# Patient Record
Sex: MALE | Race: WHITE | NOT HISPANIC OR LATINO | URBAN - METROPOLITAN AREA
[De-identification: names, ages, dates, MRNs, and addresses within clinical notes are randomized per-mention and may not be internally consistent; named-entity substitution may affect disease eponyms.]

---

## 2019-03-11 ENCOUNTER — OUTPATIENT (OUTPATIENT)
Dept: OUTPATIENT SERVICES | Facility: HOSPITAL | Age: 84
LOS: 1 days | Discharge: HOME | End: 2019-03-11

## 2019-03-11 DIAGNOSIS — N18.4 CHRONIC KIDNEY DISEASE, STAGE 4 (SEVERE): ICD-10-CM

## 2019-03-11 DIAGNOSIS — I10 ESSENTIAL (PRIMARY) HYPERTENSION: ICD-10-CM

## 2019-03-11 DIAGNOSIS — R80.9 PROTEINURIA, UNSPECIFIED: ICD-10-CM

## 2019-06-10 ENCOUNTER — OUTPATIENT (OUTPATIENT)
Dept: OUTPATIENT SERVICES | Facility: HOSPITAL | Age: 84
LOS: 1 days | Discharge: HOME | End: 2019-06-10

## 2019-06-10 DIAGNOSIS — I10 ESSENTIAL (PRIMARY) HYPERTENSION: ICD-10-CM

## 2019-06-10 DIAGNOSIS — R80.9 PROTEINURIA, UNSPECIFIED: ICD-10-CM

## 2019-06-10 DIAGNOSIS — N18.4 CHRONIC KIDNEY DISEASE, STAGE 4 (SEVERE): ICD-10-CM

## 2019-07-04 ENCOUNTER — INPATIENT (INPATIENT)
Facility: HOSPITAL | Age: 84
LOS: 6 days | Discharge: HOSPICE | End: 2019-07-11
Attending: INTERNAL MEDICINE | Admitting: INTERNAL MEDICINE
Payer: MEDICARE

## 2019-07-04 VITALS
DIASTOLIC BLOOD PRESSURE: 70 MMHG | RESPIRATION RATE: 16 BRPM | TEMPERATURE: 97 F | OXYGEN SATURATION: 96 % | HEART RATE: 78 BPM | SYSTOLIC BLOOD PRESSURE: 153 MMHG

## 2019-07-04 LAB
ALBUMIN SERPL ELPH-MCNC: 2.8 G/DL — LOW (ref 3.5–5.2)
ALP SERPL-CCNC: 143 U/L — HIGH (ref 30–115)
ALT FLD-CCNC: 63 U/L — HIGH (ref 0–41)
ANION GAP SERPL CALC-SCNC: 15 MMOL/L — HIGH (ref 7–14)
AST SERPL-CCNC: 63 U/L — HIGH (ref 0–41)
BILIRUB SERPL-MCNC: 0.4 MG/DL — SIGNIFICANT CHANGE UP (ref 0.2–1.2)
BUN SERPL-MCNC: 50 MG/DL — HIGH (ref 10–20)
CALCIUM SERPL-MCNC: 10.1 MG/DL — SIGNIFICANT CHANGE UP (ref 8.5–10.1)
CHLORIDE SERPL-SCNC: 93 MMOL/L — LOW (ref 98–110)
CO2 SERPL-SCNC: 25 MMOL/L — SIGNIFICANT CHANGE UP (ref 17–32)
CREAT SERPL-MCNC: 2.4 MG/DL — HIGH (ref 0.7–1.5)
GAS PNL BLDV: SIGNIFICANT CHANGE UP
GLUCOSE SERPL-MCNC: 119 MG/DL — HIGH (ref 70–99)
HCT VFR BLD CALC: 30.6 % — LOW (ref 42–52)
HGB BLD-MCNC: 9.9 G/DL — LOW (ref 14–18)
INR BLD: 1 RATIO — SIGNIFICANT CHANGE UP (ref 0.65–1.3)
MCHC RBC-ENTMCNC: 26.7 PG — LOW (ref 27–31)
MCHC RBC-ENTMCNC: 32.4 G/DL — SIGNIFICANT CHANGE UP (ref 32–37)
MCV RBC AUTO: 82.5 FL — SIGNIFICANT CHANGE UP (ref 80–94)
NRBC # BLD: 0 /100 WBCS — SIGNIFICANT CHANGE UP (ref 0–0)
NT-PROBNP SERPL-SCNC: 4817 PG/ML — HIGH (ref 0–300)
PLATELET # BLD AUTO: 426 K/UL — HIGH (ref 130–400)
POTASSIUM SERPL-MCNC: 5.3 MMOL/L — HIGH (ref 3.5–5)
POTASSIUM SERPL-SCNC: 5.3 MMOL/L — HIGH (ref 3.5–5)
PROT SERPL-MCNC: 6 G/DL — SIGNIFICANT CHANGE UP (ref 6–8)
PROTHROM AB SERPL-ACNC: 11.5 SEC — SIGNIFICANT CHANGE UP (ref 9.95–12.87)
RBC # BLD: 3.71 M/UL — LOW (ref 4.7–6.1)
RBC # FLD: 16.7 % — HIGH (ref 11.5–14.5)
SODIUM SERPL-SCNC: 133 MMOL/L — LOW (ref 135–146)
TROPONIN T SERPL-MCNC: 0.08 NG/ML — CRITICAL HIGH
WBC # BLD: 18.64 K/UL — HIGH (ref 4.8–10.8)
WBC # FLD AUTO: 18.64 K/UL — HIGH (ref 4.8–10.8)

## 2019-07-04 PROCEDURE — 99285 EMERGENCY DEPT VISIT HI MDM: CPT

## 2019-07-04 PROCEDURE — 72170 X-RAY EXAM OF PELVIS: CPT | Mod: 26

## 2019-07-04 PROCEDURE — 93010 ELECTROCARDIOGRAM REPORT: CPT

## 2019-07-04 PROCEDURE — 71046 X-RAY EXAM CHEST 2 VIEWS: CPT | Mod: 26

## 2019-07-04 PROCEDURE — 72125 CT NECK SPINE W/O DYE: CPT | Mod: 26

## 2019-07-04 PROCEDURE — 70450 CT HEAD/BRAIN W/O DYE: CPT | Mod: 26

## 2019-07-04 RX ORDER — IOHEXOL 300 MG/ML
30 INJECTION, SOLUTION INTRAVENOUS ONCE
Refills: 0 | Status: COMPLETED | OUTPATIENT
Start: 2019-07-04 | End: 2019-07-04

## 2019-07-04 RX ORDER — SODIUM CHLORIDE 9 MG/ML
1000 INJECTION, SOLUTION INTRAVENOUS ONCE
Refills: 0 | Status: COMPLETED | OUTPATIENT
Start: 2019-07-04 | End: 2019-07-04

## 2019-07-04 RX ADMIN — IOHEXOL 30 MILLILITER(S): 300 INJECTION, SOLUTION INTRAVENOUS at 23:03

## 2019-07-04 RX ADMIN — SODIUM CHLORIDE 1000 MILLILITER(S): 9 INJECTION, SOLUTION INTRAVENOUS at 21:48

## 2019-07-04 NOTE — ED PROVIDER NOTE - NS ED ROS FT
Constitutional: (-) fever  Eyes/ENT: (-) visual changes   Cardiovascular: (-) chest pain, (-) syncope  Respiratory: (-) cough, (-) shortness of breath  Gastrointestinal: (+) vomiting, (-) diarrhea  Genitourinary: (-) dysuria, (-) hesitancy, (-) frequency   Musculoskeletal: (-) neck pain, (-) back pain, (-) joint pain  Integumentary: (-) rash, (-) edema  Neurological: (-) headache, (-) altered mental status, weakness   Allergic/Immunologic: (-) pruritus

## 2019-07-04 NOTE — ED PROVIDER NOTE - OBJECTIVE STATEMENT
88 yo male with a pmh of CAD, DM, and anemia presents with progressive weakness. daughter states that for the past two weeks he has needed more help ambulating as well as had a decrease in appetite. he has become more SOB with ambulation and fallen twice due to the weakness. there was no LOC with the falls and daughter denies him hitting his head but the falls were unwitnessed and the pt does not recall the falls. he denies any other symptoms including fevers, chill, abdominal pain, or chest pain.

## 2019-07-04 NOTE — ED PROVIDER NOTE - ATTENDING CONTRIBUTION TO CARE
patient is BIB daughter for evaluation of CROCKETT, generalized weakness, no appetite, h/o fall twice due to generalized weakness, denies any injuries.   vitals noted  lungs: B/L good air entry, no wheezing/rhonchi  abd: +BS, NT, ND, soft  A/P: Dyspnea/generalized weakness  falls  labs, imaging, reevaluation

## 2019-07-04 NOTE — ED PROVIDER NOTE - CARE PLAN
Principal Discharge DX:	Weakness Principal Discharge DX:	[facility] (acute kidney injury)  Secondary Diagnosis:	CHF (congestive heart failure)  Secondary Diagnosis:	Troponin level elevated

## 2019-07-04 NOTE — ED PROVIDER NOTE - CLINICAL SUMMARY MEDICAL DECISION MAKING FREE TEXT BOX
Patient remained stable in ED, improved well, labs/CT findings noted, discussed with patient and his daughter, is admitted to Medicine in stable condition.

## 2019-07-04 NOTE — ED ADULT TRIAGE NOTE - CHIEF COMPLAINT QUOTE
Patient present from home for weakness, decreased PO intake, as per EMT, patient had a near fall episode at home witnessed, denies head injury or LOC, not on anticoagulant, patient is alert and oriented x3 at this time.

## 2019-07-04 NOTE — ED ADULT NURSE REASSESSMENT NOTE - NS ED NURSE REASSESS COMMENT FT1
Pt assessed A/o times 4 Vs stable remain comfortable , comfort provide denies no pain no SOB , Po contrast order is given ,Pt with B/L foot edema noted Ed attending made aware on going nursing  observation   .

## 2019-07-05 LAB
ALBUMIN SERPL ELPH-MCNC: 2.4 G/DL — LOW (ref 3.5–5.2)
ALP SERPL-CCNC: 124 U/L — HIGH (ref 30–115)
ALT FLD-CCNC: 57 U/L — HIGH (ref 0–41)
ANION GAP SERPL CALC-SCNC: 13 MMOL/L — SIGNIFICANT CHANGE UP (ref 7–14)
APPEARANCE UR: CLEAR — SIGNIFICANT CHANGE UP
AST SERPL-CCNC: 54 U/L — HIGH (ref 0–41)
BASOPHILS # BLD AUTO: 0.02 K/UL — SIGNIFICANT CHANGE UP (ref 0–0.2)
BASOPHILS NFR BLD AUTO: 0.1 % — SIGNIFICANT CHANGE UP (ref 0–1)
BILIRUB SERPL-MCNC: 0.4 MG/DL — SIGNIFICANT CHANGE UP (ref 0.2–1.2)
BILIRUB UR-MCNC: NEGATIVE — SIGNIFICANT CHANGE UP
BUN SERPL-MCNC: 47 MG/DL — HIGH (ref 10–20)
CALCIUM SERPL-MCNC: 9.3 MG/DL — SIGNIFICANT CHANGE UP (ref 8.5–10.1)
CHLORIDE SERPL-SCNC: 95 MMOL/L — LOW (ref 98–110)
CO2 SERPL-SCNC: 25 MMOL/L — SIGNIFICANT CHANGE UP (ref 17–32)
COLOR SPEC: YELLOW — SIGNIFICANT CHANGE UP
CREAT SERPL-MCNC: 2.1 MG/DL — HIGH (ref 0.7–1.5)
DIFF PNL FLD: NEGATIVE — SIGNIFICANT CHANGE UP
EOSINOPHIL # BLD AUTO: 0.02 K/UL — SIGNIFICANT CHANGE UP (ref 0–0.7)
EOSINOPHIL NFR BLD AUTO: 0.1 % — SIGNIFICANT CHANGE UP (ref 0–8)
GLUCOSE BLDC GLUCOMTR-MCNC: 127 MG/DL — HIGH (ref 70–99)
GLUCOSE BLDC GLUCOMTR-MCNC: 132 MG/DL — HIGH (ref 70–99)
GLUCOSE BLDC GLUCOMTR-MCNC: 139 MG/DL — HIGH (ref 70–99)
GLUCOSE BLDC GLUCOMTR-MCNC: 174 MG/DL — HIGH (ref 70–99)
GLUCOSE SERPL-MCNC: 132 MG/DL — HIGH (ref 70–99)
GLUCOSE UR QL: NEGATIVE MG/DL — SIGNIFICANT CHANGE UP
HCT VFR BLD CALC: 27.9 % — LOW (ref 42–52)
HGB BLD-MCNC: 9 G/DL — LOW (ref 14–18)
IMM GRANULOCYTES NFR BLD AUTO: 0.7 % — HIGH (ref 0.1–0.3)
IRON SATN MFR SERPL: 24 % — SIGNIFICANT CHANGE UP (ref 15–50)
IRON SATN MFR SERPL: 29 UG/DL — LOW (ref 35–150)
KETONES UR-MCNC: NEGATIVE — SIGNIFICANT CHANGE UP
LDH SERPL L TO P-CCNC: 253 U/L — HIGH (ref 50–242)
LEUKOCYTE ESTERASE UR-ACNC: NEGATIVE — SIGNIFICANT CHANGE UP
LYMPHOCYTES # BLD AUTO: 1.55 K/UL — SIGNIFICANT CHANGE UP (ref 1.2–3.4)
LYMPHOCYTES # BLD AUTO: 9.9 % — LOW (ref 20.5–51.1)
MAGNESIUM SERPL-MCNC: 2.1 MG/DL — SIGNIFICANT CHANGE UP (ref 1.8–2.4)
MCHC RBC-ENTMCNC: 26.8 PG — LOW (ref 27–31)
MCHC RBC-ENTMCNC: 32.3 G/DL — SIGNIFICANT CHANGE UP (ref 32–37)
MCV RBC AUTO: 83 FL — SIGNIFICANT CHANGE UP (ref 80–94)
MONOCYTES # BLD AUTO: 0.73 K/UL — HIGH (ref 0.1–0.6)
MONOCYTES NFR BLD AUTO: 4.7 % — SIGNIFICANT CHANGE UP (ref 1.7–9.3)
NEUTROPHILS # BLD AUTO: 13.23 K/UL — HIGH (ref 1.4–6.5)
NEUTROPHILS NFR BLD AUTO: 84.5 % — HIGH (ref 42.2–75.2)
NITRITE UR-MCNC: NEGATIVE — SIGNIFICANT CHANGE UP
NRBC # BLD: 0 /100 WBCS — SIGNIFICANT CHANGE UP (ref 0–0)
PH UR: 6 — SIGNIFICANT CHANGE UP (ref 5–8)
PLATELET # BLD AUTO: 460 K/UL — HIGH (ref 130–400)
POTASSIUM SERPL-MCNC: 4.8 MMOL/L — SIGNIFICANT CHANGE UP (ref 3.5–5)
POTASSIUM SERPL-SCNC: 4.8 MMOL/L — SIGNIFICANT CHANGE UP (ref 3.5–5)
PROT SERPL-MCNC: 4.9 G/DL — LOW (ref 6–8)
PROT UR-MCNC: NEGATIVE MG/DL — SIGNIFICANT CHANGE UP
RBC # BLD: 3.36 M/UL — LOW (ref 4.7–6.1)
RBC # BLD: 3.36 M/UL — LOW (ref 4.7–6.1)
RBC # FLD: 16.8 % — HIGH (ref 11.5–14.5)
RETICS #: 74.8 K/UL — SIGNIFICANT CHANGE UP (ref 25–125)
RETICS/RBC NFR: 2.2 % — HIGH (ref 0.5–1.5)
SODIUM SERPL-SCNC: 133 MMOL/L — LOW (ref 135–146)
SP GR SPEC: 1.01 — SIGNIFICANT CHANGE UP (ref 1.01–1.03)
TIBC SERPL-MCNC: 119 UG/DL — LOW (ref 220–430)
TROPONIN T SERPL-MCNC: 0.08 NG/ML — CRITICAL HIGH
UIBC SERPL-MCNC: 90 UG/DL — LOW (ref 110–370)
UROBILINOGEN FLD QL: 0.2 MG/DL — SIGNIFICANT CHANGE UP (ref 0.2–0.2)
WBC # BLD: 15.66 K/UL — HIGH (ref 4.8–10.8)
WBC # FLD AUTO: 15.66 K/UL — HIGH (ref 4.8–10.8)

## 2019-07-05 PROCEDURE — 99222 1ST HOSP IP/OBS MODERATE 55: CPT

## 2019-07-05 PROCEDURE — 71260 CT THORAX DX C+: CPT | Mod: 26

## 2019-07-05 PROCEDURE — 93306 TTE W/DOPPLER COMPLETE: CPT | Mod: 26

## 2019-07-05 PROCEDURE — 99223 1ST HOSP IP/OBS HIGH 75: CPT

## 2019-07-05 PROCEDURE — 74176 CT ABD & PELVIS W/O CONTRAST: CPT | Mod: 26

## 2019-07-05 RX ORDER — LISINOPRIL 2.5 MG/1
5 TABLET ORAL DAILY
Refills: 0 | Status: DISCONTINUED | OUTPATIENT
Start: 2019-07-05 | End: 2019-07-05

## 2019-07-05 RX ORDER — METOPROLOL TARTRATE 50 MG
50 TABLET ORAL DAILY
Refills: 0 | Status: DISCONTINUED | OUTPATIENT
Start: 2019-07-05 | End: 2019-07-11

## 2019-07-05 RX ORDER — LATANOPROST 0.05 MG/ML
1 SOLUTION/ DROPS OPHTHALMIC; TOPICAL AT BEDTIME
Refills: 0 | Status: DISCONTINUED | OUTPATIENT
Start: 2019-07-05 | End: 2019-07-11

## 2019-07-05 RX ORDER — FUROSEMIDE 40 MG
1 TABLET ORAL
Qty: 0 | Refills: 0 | DISCHARGE

## 2019-07-05 RX ORDER — DOCUSATE SODIUM 100 MG
100 CAPSULE ORAL THREE TIMES A DAY
Refills: 0 | Status: DISCONTINUED | OUTPATIENT
Start: 2019-07-05 | End: 2019-07-11

## 2019-07-05 RX ORDER — CHLORHEXIDINE GLUCONATE 213 G/1000ML
1 SOLUTION TOPICAL
Refills: 0 | Status: DISCONTINUED | OUTPATIENT
Start: 2019-07-05 | End: 2019-07-11

## 2019-07-05 RX ORDER — FUROSEMIDE 40 MG
20 TABLET ORAL DAILY
Refills: 0 | Status: DISCONTINUED | OUTPATIENT
Start: 2019-07-05 | End: 2019-07-05

## 2019-07-05 RX ORDER — HEPARIN SODIUM 5000 [USP'U]/ML
5000 INJECTION INTRAVENOUS; SUBCUTANEOUS EVERY 8 HOURS
Refills: 0 | Status: DISCONTINUED | OUTPATIENT
Start: 2019-07-05 | End: 2019-07-11

## 2019-07-05 RX ORDER — POLYETHYLENE GLYCOL 3350 17 G/17G
17 POWDER, FOR SOLUTION ORAL
Refills: 0 | Status: DISCONTINUED | OUTPATIENT
Start: 2019-07-05 | End: 2019-07-11

## 2019-07-05 RX ORDER — QUINAPRIL HYDROCHLORIDE 40 MG/1
1 TABLET, FILM COATED ORAL
Qty: 0 | Refills: 0 | DISCHARGE

## 2019-07-05 RX ORDER — ATORVASTATIN CALCIUM 80 MG/1
1 TABLET, FILM COATED ORAL
Qty: 0 | Refills: 0 | DISCHARGE

## 2019-07-05 RX ORDER — ASPIRIN/CALCIUM CARB/MAGNESIUM 324 MG
81 TABLET ORAL DAILY
Refills: 0 | Status: DISCONTINUED | OUTPATIENT
Start: 2019-07-05 | End: 2019-07-05

## 2019-07-05 RX ORDER — BENZOYL PEROXIDE MICRONIZED 5.8 %
1 TOWELETTE (EA) TOPICAL
Qty: 0 | Refills: 0 | DISCHARGE

## 2019-07-05 RX ORDER — METOPROLOL TARTRATE 50 MG
1 TABLET ORAL
Qty: 0 | Refills: 0 | DISCHARGE

## 2019-07-05 RX ORDER — ATORVASTATIN CALCIUM 80 MG/1
80 TABLET, FILM COATED ORAL AT BEDTIME
Refills: 0 | Status: DISCONTINUED | OUTPATIENT
Start: 2019-07-05 | End: 2019-07-11

## 2019-07-05 RX ORDER — ASPIRIN/CALCIUM CARB/MAGNESIUM 324 MG
81 TABLET ORAL DAILY
Refills: 0 | Status: DISCONTINUED | OUTPATIENT
Start: 2019-07-05 | End: 2019-07-11

## 2019-07-05 RX ORDER — LATANOPROST 0.05 MG/ML
1 SOLUTION/ DROPS OPHTHALMIC; TOPICAL
Qty: 0 | Refills: 0 | DISCHARGE

## 2019-07-05 RX ORDER — LISINOPRIL 2.5 MG/1
5 TABLET ORAL DAILY
Refills: 0 | Status: DISCONTINUED | OUTPATIENT
Start: 2019-07-05 | End: 2019-07-07

## 2019-07-05 RX ADMIN — Medication 81 MILLIGRAM(S): at 12:05

## 2019-07-05 RX ADMIN — Medication 100 MILLIGRAM(S): at 21:14

## 2019-07-05 RX ADMIN — Medication 20 MILLIGRAM(S): at 06:28

## 2019-07-05 RX ADMIN — POLYETHYLENE GLYCOL 3350 17 GRAM(S): 17 POWDER, FOR SOLUTION ORAL at 17:32

## 2019-07-05 RX ADMIN — LISINOPRIL 5 MILLIGRAM(S): 2.5 TABLET ORAL at 12:05

## 2019-07-05 RX ADMIN — ATORVASTATIN CALCIUM 80 MILLIGRAM(S): 80 TABLET, FILM COATED ORAL at 21:13

## 2019-07-05 RX ADMIN — HEPARIN SODIUM 5000 UNIT(S): 5000 INJECTION INTRAVENOUS; SUBCUTANEOUS at 21:14

## 2019-07-05 RX ADMIN — HEPARIN SODIUM 5000 UNIT(S): 5000 INJECTION INTRAVENOUS; SUBCUTANEOUS at 14:29

## 2019-07-05 RX ADMIN — Medication 100 MILLIGRAM(S): at 14:29

## 2019-07-05 RX ADMIN — LATANOPROST 1 DROP(S): 0.05 SOLUTION/ DROPS OPHTHALMIC; TOPICAL at 21:14

## 2019-07-05 RX ADMIN — Medication 50 MILLIGRAM(S): at 06:28

## 2019-07-05 NOTE — H&P ADULT - ATTENDING COMMENTS
89 years old male with reported history of CAD but refused stent, DM, and chronic anemia, presented to the ED with progressive weakness. patient is a poor historian and history was obtained from chart and family member. As per his daughter, patient has become more unsteady on ambulation and requires more assistance. Patient also has decreased appetite. Patient has fallen twice at home and they were both unwitnessed. Patient has no recollection of his fall and denies any LOC. Family also reports increased work of breathing, especially on exertion. Patient himself has no acute complaints and reports good appetite. In ED, trauma work up was completed due to previous falls and an incidental findings of 9.8cm colonic mass is seen in ascending colon.    REVIEW OF SYSTEMS: see cc/HPI  CONSTITUTIONAL: No weakness, fevers or chills  EYES/ENT: No visual changes;  No vertigo or throat pain   NECK: No pain or stiffness  RESPIRATORY: No cough, wheezing, hemoptysis; No shortness of breath  CARDIOVASCULAR: No chest pain or palpitations  GASTROINTESTINAL: No abdominal or epigastric pain. No nausea, vomiting, or hematemesis; No diarrhea or constipation. No melena or hematochezia.  GENITOURINARY: No dysuria, frequency or hematuria  NEUROLOGICAL: No numbness or weakness  SKIN: No itching, rashes  Physical Exam:   General: WN/WD NAD  Neurology: A&Ox3, nonfocal, follows commands  Eyes: PERRLA/ EOMI  ENT/Neck: Neck supple, trachea midline, No JVD  Respiratory: CTA B/L, No wheezing, rales, rhonchi  CV: Normal rate regular rhythm, S1S2, no murmurs, rubs or gallops  Abdominal: Soft, NT, ND +BS,   Extremities: No edema, + peripheral pulses  Skin: No Rashes, Hematoma, Ecchymosis  Incisions:   Tubes: 89 years old male with reported history of CAD but refused stent, DM, and chronic anemia, presented to the ED with progressive weakness. patient is a poor historian and history was obtained from chart and family member. As per his daughter, patient has become more unsteady on ambulation and requires more assistance. Patient also has decreased appetite. Patient has fallen twice at home and they were both unwitnessed. Patient has no recollection of his fall and denies any LOC. Family also reports increased work of breathing, especially on exertion. Patient himself has no acute complaints and reports good appetite. In ED, trauma work up was completed due to previous falls and an incidental findings of 9.8cm colonic mass is seen in ascending colon.    REVIEW OF SYSTEMS: see cc/HPI  CONSTITUTIONAL: No weakness, fevers or chills  EYES/ENT: No visual changes;  No vertigo or throat pain   NECK: No pain or stiffness  RESPIRATORY: No cough, wheezing, hemoptysis; No shortness of breath  CARDIOVASCULAR: No chest pain or palpitations  GASTROINTESTINAL: No abdominal or epigastric pain. No nausea, vomiting, or hematemesis; No diarrhea or constipation. No melena or hematochezia.  GENITOURINARY: No dysuria, frequency or hematuria  NEUROLOGICAL: No numbness or weakness  SKIN: No itching, rashes  Physical Exam:   General: WN/WD NAD  Neurology: A&Ox3, nonfocal, follows commands  Eyes: PERRLA/ EOMI  ENT/Neck: Neck supple, trachea midline, No JVD  Respiratory: CTA B/L, No wheezing, rales, rhonchi  CV: Normal rate regular rhythm, S1S2, no murmurs, rubs or gallops  Abdominal: Soft, NT, ND +BS,   Extremities: No edema, + peripheral pulses  Skin: No Rashes, Hematoma, Ecchymosis  Incisions: n/.a  Tubes: n/a    A/P  Ascending colon mass r/o neoplasia  -Agree w/ tumor markers and staging C chest   -GI eval for colonoscopy / biopsy  -clear liquid diet if procedure planned     CKG - stage IV   -serial Scr   -IV hydration pre and post CT     Anemia - r/o RAJAN ( given mass0 +/- 2/2 chronic disease   -check iron stores/ B12 / Folate/ retic %    CAD- stable   -hold off in ASA if colonoscopy/biopsy planned  -c/w other cardiac rx  -2D echo    Type II DM   -f/s and insulin sliding scale    Leukocytosis - ???etiology  -check UCx and Blood Cx   -serial CBC    DVT prophylaxis 89 years old male with reported history of CAD but refused stent, DM, and chronic anemia, presented to the ED with progressive weakness. patient is a poor historian and history was obtained from chart and family member. As per his daughter, patient has become more unsteady on ambulation and requires more assistance. Patient also has decreased appetite. Patient has fallen twice at home and they were both unwitnessed. Patient has no recollection of his fall and denies any LOC. Family also reports increased work of breathing, especially on exertion. Patient himself has no acute complaints and reports good appetite. In ED, trauma work up was completed due to previous falls and an incidental findings of 9.8cm colonic mass is seen in ascending colon.    REVIEW OF SYSTEMS: see cc/HPI  CONSTITUTIONAL: No weakness, fevers or chills  EYES/ENT: No visual changes;  No vertigo or throat pain   NECK: No pain or stiffness  RESPIRATORY: No cough, wheezing, hemoptysis; No shortness of breath  CARDIOVASCULAR: No chest pain or palpitations  GASTROINTESTINAL: No abdominal or epigastric pain. No nausea, vomiting, or hematemesis; No diarrhea or constipation. No melena or hematochezia.  GENITOURINARY: No dysuria, frequency or hematuria  NEUROLOGICAL: No numbness or weakness  SKIN: No itching, rashes  Physical Exam:   General: WN/WD NAD  Neurology: A&Ox3, nonfocal, follows commands  Eyes: PERRLA/ EOMI  ENT/Neck: Neck supple, trachea midline, No JVD  Respiratory: CTA B/L, No wheezing, rales, rhonchi  CV: Normal rate regular rhythm, S1S2, no murmurs, rubs or gallops  Abdominal: Soft, NT, ND +BS,   Extremities: (+) pedal edema, + peripheral pulses  Skin: No Rashes, Hematoma, Ecchymosis  Incisions: n/.a  Tubes: n/a    A/P  Ascending colon mass r/o neoplasia  -Agree w/ tumor markers and staging C chest   -GI eval for colonoscopy / biopsy  -clear liquid diet if procedure planned     Weakness / Falls r/o Arrhythmia r/o orthostatic hypotension  -admit to tele   -IV fluids - gentle hydration and hold diuretics  -orthostatic vitals   -check 2D echo r/o AS    CKG - stage IV / Dehydration (BUN /Cr 50/2.4)  -serial Scr   -IV hydration pre and post CT   -hold Lasix for now and monitor for fluid overload ( mild pedal edema present)    Anemia - r/o RAJAN ( given mass0 +/- 2/2 chronic disease   -check iron stores/ B12 / Folate/ retic %    CAD- stable   -hold off in ASA if colonoscopy/biopsy planned  -c/w other cardiac rx  -2D echo    Type II DM   -f/s and insulin sliding scale    Leukocytosis - ???etiology  -check UCx and Blood Cx   -serial CBC    DVT prophylaxis 89 years old male with reported history of CAD but refused stent, DM, and chronic anemia, presented to the ED with progressive weakness. patient is a poor historian and history was obtained from chart and family member. As per his daughter, patient has become more unsteady on ambulation and requires more assistance. Patient also has decreased appetite. Patient has fallen twice at home and they were both unwitnessed. Patient has no recollection of his fall and denies any LOC. Family also reports increased work of breathing, especially on exertion. Patient himself has no acute complaints and reports good appetite. In ED, trauma work up was completed due to previous falls and an incidental findings of 9.8cm colonic mass is seen in ascending colon.    REVIEW OF SYSTEMS: see cc/HPI  CONSTITUTIONAL: No weakness, fevers or chills  EYES/ENT: No visual changes;  No vertigo or throat pain   NECK: No pain or stiffness  RESPIRATORY: No cough, wheezing, hemoptysis; No shortness of breath  CARDIOVASCULAR: No chest pain or palpitations  GASTROINTESTINAL: No abdominal or epigastric pain. No nausea, vomiting, or hematemesis; No diarrhea or constipation. No melena or hematochezia.  GENITOURINARY: No dysuria, frequency or hematuria  NEUROLOGICAL: No numbness or weakness  SKIN: No itching, rashes  Physical Exam:   General: WN/WD NAD  Neurology: A&Ox3, nonfocal, follows commands  Eyes: PERRLA/ EOMI  ENT/Neck: Neck supple, trachea midline, No JVD  Respiratory: CTA B/L, No wheezing, rales, rhonchi  CV: Normal rate regular rhythm, S1S2, no murmurs, rubs or gallops  Abdominal: Soft, NT, ND +BS,   Extremities: (+) pedal edema, + peripheral pulses  Skin: No Rashes, Hematoma, Ecchymosis  Incisions: n/.a  Tubes: n/a    A/P  Ascending colon mass r/o neoplasia  -Agree w/ tumor markers and staging C chest   -GI eval for colonoscopy / biopsy  -clear liquid diet if procedure planned     Weakness / Falls r/o Arrhythmia r/o orthostatic hypotension and abnormal Trop ( possible related to CKD) r/o MI  -admit to tele  -serial [place] and EKG   -IV fluids - gentle hydration and hold diuretics  -orthostatic vitals   -check 2D echo r/o AS    CKG - stage IV / Dehydration (BUN /Cr 50/2.4)  -serial Scr   -IV hydration pre and post CT   -hold Lasix for now and monitor for fluid overload ( mild pedal edema present)  -hold ACEI and Nephrotoxic agents    Anemia - r/o RAJAN ( given mass0 +/- 2/2 chronic disease   -check iron stores/ B12 / Folate/ retic %    CAD- stable   -hold off in ASA if colonoscopy/biopsy planned  -c/w other cardiac rx  -2D echo    Type II DM   -f/s and insulin sliding scale    Leukocytosis - ???etiology  -check UCx and Blood Cx   -serial CBC    DVT prophylaxis

## 2019-07-05 NOTE — H&P ADULT - NSHPLABSRESULTS_GEN_ALL_CORE
9.9    18.64 )-----------( 426      ( 2019 21:02 )             30.6   07-    133<L>  |  93<L>  |  50<H>  ----------------------------<  119<H>  5.3<H>   |  25  |  2.4<H>    Ca    10.1      2019 21:02    TPro  6.0  /  Alb  2.8<L>  /  TBili  0.4  /  DBili  x   /  AST  63<H>  /  ALT  63<H>  /  AlkPhos  143<H>  07    PT/INR - ( 2019 21:02 )   PT: 11.50 sec;   INR: 1.00 ratio      CARDIAC MARKERS ( 2019 21:02 )  x     / 0.08 ng/mL / x     / x     / x        Serum Pro-Brain Natriuretic Peptide: 4817 pg/mL (19 @ 21:02)    Urinalysis Basic - ( 2019 01:45 )    Color: Yellow / Appearance: Clear / S.015 / pH: x  Gluc: x / Ketone: Negative  / Bili: Negative / Urobili: 0.2 mg/dL   Blood: x / Protein: Negative mg/dL / Nitrite: Negative   Leuk Esterase: Negative / RBC: x / WBC x   Sq Epi: x / Non Sq Epi: x / Bacteria: x      < from: CT Head No Cont (19 @ 22:38) >    No CT evidence for acute intracranial pathology.    < end of copied text >    < from: CT Cervical Spine No Cont (19 @ 22:38) >    No acute fracture or significant subluxation of the cervical spine.    < end of copied text >    < from: CT Abdomen and Pelvis w/ Oral Cont (19 @ 02:09) >    1. Ascending colon 9.8 cm long mass, suspicious for neoplasm.   2. Tumor abuts the second portion of duodenum.  3. Large rectal and colonic stool load, proximal and distal to mass. No evidence of small bowel obstruction    < end of copied text >

## 2019-07-05 NOTE — H&P ADULT - HISTORY OF PRESENT ILLNESS
89 years old male with reported history of CAD but refused stent, DM, and chronic anemia 89 years old male with reported history of CAD but refused stent, DM, and chronic anemia, presented to the ED with progressive weakness.       In ED: 1L LR 89 years old male with reported history of CAD but refused stent, DM, and chronic anemia, presented to the ED with progressive weakness. patient is a poor historian and history was obtained from chart and family member. As per his daughter, patient has become more unsteady on ambulation and requires more assistance. Patient also has decreased appetite. Patient has fallen twice at home and they were both unwitnessed. Patient has no recollection of his fall and denies any LOC. Family also reports increased work of breathing, especially on exertion. Patient himself has no acute complaints and reports good appetite. In ED, trauma work up was completed due to previous falls and an incidental findings of 9.8cm colonic mass is seen in ascending colon.      In ED: 1L LR

## 2019-07-05 NOTE — CONSULT NOTE ADULT - ASSESSMENT
89 years old male with reported history of CAD, DM, HTN and chronic anemia, presented to the ED with progressive weakness. Family reports worsening shortness of breath on exertion. Patient himself has no acute complaints and reports good appetite. No complaints of abdominal pain, nausea, vomiting, diarrhea, change in bowel habits, rectal pain. No complaints of alexi bleeding in stool or change in color of stool.  Patient never had colonoscopy in the past. No family history of colon cancer. Incidental finding of 9.8cm colonic mass seen in ascending colon on CT abdomen with CBC showing iron deficiency anemia.     #Ascending colon mass  -Asymptomatic. CBC showed low hemoglobin with low normal MCV and high RDW. Send iron studies  -CT abdomen with oral contrast showed incidental 9.8cm long mass in ascending colon abuting second part of duodenum. Large rectal and colonic stool load seen proximal and distal to the mass.  -Needs colonoscopy with biopsy to determine the nature of the mass  -Patient and family agreed for colonoscopy  -Laxatives like miralax to empty stool load before colonoscopy. Golytely and dulcolax to prepare the colon for colonoscopy once constipation is relieved 89 years old male with reported history of CAD, DM, HTN and chronic anemia, presented to the ED with progressive weakness. Family reports worsening shortness of breath on exertion. Patient himself has no acute complaints and reports good appetite. No complaints of abdominal pain, nausea, vomiting, diarrhea, change in bowel habits, rectal pain. No complaints of alexi bleeding in stool or change in color of stool.  Patient never had colonoscopy in the past. No family history of colon cancer. Incidental finding of 9.8cm colonic mass seen in ascending colon on CT abdomen with CBC showing iron deficiency anemia.     #Ascending colon mass  -Asymptomatic. CBC showed low hemoglobin with low normal MCV and high RDW. Send iron studies  -CT abdomen with oral contrast showed incidental 9.8cm long mass in ascending colon abuting second part of duodenum. Large rectal and colonic stool load seen proximal and distal to the mass.  -Needs colonoscopy with biopsy to determine the nature of the mass  -Send tumor markers Ca 19-9 and CEA  -Patient and family agreed for colonoscopy  -Laxatives like miralax to empty stool load before colonoscopy. Golytely and dulcolax to prepare the colon for colonoscopy once constipation is relieved 89 years old male with reported history of Mild dementia, CAD, DM, HTN and chronic anemia, presented to the ED with progressive weakness. Family reports worsening shortness of breath on exertion. As per the daughter, patient has been having severe constipation for the last few months alternating with large bowel movement once in a week which the family attributed to iron pills. Worsening anemia for which PMD started the patient on Ferrous sulphate TID. c/o subjective weight loss, decreased appetite in last 3 months.  No complaints of abdominal pain, nausea, rectal pain. No complaints of alexi bleeding in stool or change in color of stool.  Patient had colonoscopies in the past as part of routine screening and last one was more than 10yrs ago. Polyps removed as per the daughter but unsure about the results and doesn't remember where it was done. Father had colon cancer at the age of 83.  Incidental finding of 9.8cm colonic mass seen in ascending colon on CT abdomen with CBC showing iron deficiency anemia.     #Ascending colon mass  -Severe constipation.  CBC showed low hemoglobin with low normal MCV and high RDW. Send iron studies  -CT abdomen with oral contrast showed incidental 9.8cm long mass in ascending colon abuting second part of duodenum. Large rectal and colonic stool load seen proximal and distal to the mass.  -Needs colonoscopy with biopsy to determine the nature of the mass  -Send tumor markers Ca 19-9 and CEA  -Patient and family agreed for colonoscopy  -Laxatives like miralax to empty stool load before colonoscopy. Golytely and dulcolax to prepare the colon for colonoscopy once constipation is relieved 89 years old male with reported history of Mild dementia, CAD, DM, HTN and chronic anemia, presented to the ED with progressive weakness. Family reports worsening shortness of breath on exertion. As per the daughter, patient has been having severe constipation for the last few months alternating with large bowel movement once in a week which the family attributed to iron pills. Worsening anemia for which PMD started the patient on Ferrous sulphate TID. c/o subjective weight loss, decreased appetite in last 3 months.  No complaints of abdominal pain, nausea, rectal pain. No complaints of alexi bleeding in stool or change in color of stool.  Patient had colonoscopies in the past as part of routine screening and last one was more than 10yrs ago. Polyps removed as per the daughter but unsure about the results and doesn't remember where it was done. Father had colon cancer at the age of 83.  Incidental finding of 9.8cm colonic mass seen in ascending colon on CT abdomen with CBC showing iron deficiency anemia.     #Ascending colon mass  -Severe constipation. CBC showed low hemoglobin with low normal MCV and high RDW. Send iron studies  -CT abdomen with oral contrast showed incidental 9.8cm long mass in ascending colon abuting second part of duodenum. Large rectal and colonic stool load seen proximal and distal to the mass.  -Patient needs disimpaction before colonoscopy. Soap water enemas every 4h, miralax, senna, colace until bowel is cleared off stool.   -Ultimately needs colonoscopy with biopsy to determine the nature of the mass  -Send tumor markers Ca 19-9 and CEA  -Patient and family agreed for colonoscopy after disimpaction 89 years old male with reported history of Mild dementia, CAD, DM, HTN and chronic anemia, presented to the ED with progressive weakness. Family reports worsening shortness of breath on exertion. As per the daughter, patient has been having severe constipation for the last few months alternating with large bowel movement once in a week which the family attributed to iron pills. Worsening anemia for which PMD started the patient on Ferrous sulphate TID. c/o subjective weight loss, decreased appetite in last 3 months.  No complaints of abdominal pain, nausea, rectal pain. No complaints of alexi bleeding in stool or change in color of stool.  Patient had colonoscopies in the past as part of routine screening and last one was more than 10yrs ago. Polyps removed as per the daughter but unsure about the results and doesn't remember where it was done. Father had colon cancer at the age of 83.  Incidental finding of 9.8cm colonic mass seen in ascending colon on CT abdomen with CBC showing iron deficiency anemia.     #Ascending colon mass  -Severe constipation. CBC showed low hemoglobin with low normal MCV and high RDW. Send iron studies  -CT abdomen with oral contrast showed incidental 9.8cm long mass in ascending colon abuting second part of duodenum. Large rectal and colonic stool load seen proximal and distal to the mass.  -Patient needs disimpaction before colonoscopy. Soap water enemas every 4h, miralax, senna, colace until bowel is cleared off stool.   -Ultimately needs colonoscopy with biopsy to determine the nature of the mass  -Send tumor markers Ca 19-9 and CEA  -Patient and family agreed for colonoscopy after disimpaction    will f/up

## 2019-07-05 NOTE — H&P ADULT - NSHPPHYSICALEXAM_GEN_ALL_CORE
PHYSICAL EXAM:  GENERAL: NAD, lying in bed comfortably  HEAD:  Atraumatic, Normocephalic  EYES: EOMI, PERRLA, conjunctiva and sclera clear  ENT: Moist mucous membranes  NECK: Supple, No JVD  CHEST/LUNG: Clear to auscultation bilaterally; No rales, rhonchi, wheezing, or rubs. Unlabored respirations  HEART: Regular rate and rhythm; No murmurs, rubs, or gallops  ABDOMEN: Bowel sounds present; Soft, Nontender, Nondistended. No hepatomegally  EXTREMITIES:  2+ Peripheral Pulses, brisk capillary refill. No clubbing, cyanosis, or edema  NERVOUS SYSTEM:  Alert & Oriented X3, speech clear. No deficits   MSK: FROM all 4 extremities, full and equal strength  SKIN: No rashes or lesions GENERAL: NAD, lying in bed comfortably  HEAD:  Atraumatic, Normocephalic  EYES: EOMI, PERRLA, conjunctiva pink and cornea white  ENT: Moist mucous membranes  NECK: Supple, No JVD  CHEST/LUNG: Decreased bibasilar breath sounds  HEART: Regular rate and rhythm; No murmurs, rubs, or gallops  ABDOMEN: Bowel sounds present; Soft, Nontender, Nondistended. No hepatomegaly  EXTREMITIES:  2+ Peripheral Pulses, brisk capillary refill. No petal edema  NERVOUS SYSTEM: awake and alert only to person only, no deficit in could appreciated   MSK: FROM all 4 extremities, full and equal strength  SKIN: No rashes or lesions GENERAL: NAD, lying in bed comfortably  HEAD:  Atraumatic, Normocephalic  EYES: EOMI, PERRLA, conjunctiva pink and cornea white  ENT: Moist mucous membranes  NECK: Supple, No JVD  CHEST/LUNG: Decreased bibasilar breath sounds  HEART: Regular rate and rhythm; No murmurs, rubs, or gallops  ABDOMEN: Bowel sounds present; Soft, Nontender, Nondistended. No hepatomegaly  EXTREMITIES:  2+ Peripheral Pulses, brisk capillary refill. 2+ pitting edema bilaterally  NERVOUS SYSTEM: awake and alert only to person only, no deficit in could appreciated   MSK: FROM all 4 extremities, full and equal strength  SKIN: No rashes or lesions

## 2019-07-05 NOTE — H&P ADULT - ASSESSMENT
89 years old male with reported history of CAD but refused stent, DM, and chronic anemia, presented to the ED with progressive weakness.    # Mass at ascending colon, likely malignancy  - CT shows above result with tumor abutting the second portion of duodenum  - No obstruction is seen but large stool load, will continue to monitor  - Check CEA,   - Aggressive bowel regimen: colace, Miralax BID  - Consult GI for possible colonoscopy and biopsy  - Check CT chest w/wo IV contrast to complete tumor workup    # CKD stage IV  - Cr 2.4, at baseline  - Non-oliguric  - Avoid nephrotoxic drug  - Will start gentle IVF 12 hours in advance before repeating CT chest  - BMP daily    # Normocytic anemia  - No signs of acute bleeding  - Could be anemia with chronic diseases  - Check iron studies, hapto, LDH, rect, B12 and folate    # HTN  - Continue metoprolol 50mg Q24hrs, hold Lisinopril for now due to hyperkalemia    # CAD  - stable, BNP 4800  - Start Aspirin 81mg (through not in patient's home med)  - Continue Lipitor 80mg Q24hrs and Lasix 20mg Q24hrs    # T2DM  - Check A1C  - Monitor FS, start insulin basal/bolus if FS persistently > 180    # Leukocytosis  - No signs of systemic infection  - Could be reactive vs malignancy?  - Continue to monitor      DVT ppx: SubQ heparin  GI ppx: Not indicated  Diet: DASH  Activity: OOBTC  Code status: full code  Dispo: acute, pending GI recs 89 years old male with reported history of CAD but refused stent, DM, and chronic anemia, presented to the ED with progressive weakness.    # Mass at ascending colon, likely malignancy  - CT shows above result with tumor abutting the second portion of duodenum  - No obstruction is seen but large stool load, will continue to monitor  - Check CEA,   - Aggressive bowel regimen: colace, Miralax BID  - Consult GI for possible colonoscopy and biopsy  - Check CT chest w/wo IV contrast to complete tumor workup    # CKD stage IV  - Cr 2.4, at baseline  - Non-oliguric  - Avoid nephrotoxic drug  - Will start gentle IVF 6 hours in advance before doing CT chest  - BMP daily    # Normocytic anemia  - No signs of acute bleeding  - Could be anemia with chronic diseases  - Check iron studies, hapto, LDH, rect, B12 and folate    # HTN  - Continue metoprolol 50mg Q24hrs, hold Lisinopril for now due to hyperkalemia    # CAD  - stable, BNP 4800  - Start Aspirin 81mg (through not in patient's home med)  - Continue Lipitor 80mg Q24hrs and Lasix 20mg Q24hrs  - Check ECHO    # T2DM  - Check A1C  - Monitor FS, start insulin basal/bolus if FS persistently > 180    # Leukocytosis  - No signs of systemic infection  - Could be reactive vs malignancy?  - Continue to monitor      DVT ppx: SubQ heparin  GI ppx: Not indicated  Diet: DASH  Activity: OOBTC  Code status: full code  Dispo: acute, pending GI recs      * Please call patient's family/pharmacy to confirm med recs *

## 2019-07-05 NOTE — H&P ADULT - NSICDXPASTMEDICALHX_GEN_ALL_CORE_FT
PAST MEDICAL HISTORY:  CAD (coronary artery disease)     DM (diabetes mellitus)     Glaucoma     HTN (hypertension)

## 2019-07-05 NOTE — CONSULT NOTE ADULT - SUBJECTIVE AND OBJECTIVE BOX
GI HPI:  Patient is a 89y old  Male who presents with a chief complaint of unsteadiness and falls.      Hospital course:  89 years old male with reported history of CAD, DM, HTN and chronic anemia, presented to the ED with progressive weakness. As per his daughter, patient has become more unsteady on ambulation and requires more assistance. Patient also has decreased appetite. Patient has fallen twice at home and they were both unwitnessed.  Family also reports increased work of breathing, especially on exertion. Patient himself has no acute complaints and reports good appetite. In ED, trauma work up was completed due to previous falls and an incidental findings of 9.8cm colonic mass is seen in ascending colon.    GI: No complaints of abdominal pain, nausea, vomiting, diarrhea, change in bowel habits, rectal pain. No complaints of alexi bleeding in stool or change in color of stool.  Patient never had colonoscopy in the past. No family history of colon cancer.  Incidental finding of 9.8cm colonic mass seen in ascending colon on CT abdomen with CBC showing iron deficiency anemia.     PAST MEDICAL & SURGICAL HISTORY  DM (diabetes mellitus)  HTN (hypertension)  Glaucoma  CAD (coronary artery disease)  No significant past surgical history      FAMILY HISTORY:  FAMILY HISTORY:  No pertinent family history in first degree relatives      SOCIAL HISTORY:  smoker: stopped 1 yr ago. Smoked 1/2 pc for 10yrs  Alcohol: Occasional alcoholic  Drug: no illicit drug use    ALLERGIES:  No Known Allergies      MEDICATIONS:  MEDICATIONS  (STANDING):  atorvastatin 80 milliGRAM(s) Oral at bedtime  chlorhexidine 4% Liquid 1 Application(s) Topical <User Schedule>  docusate sodium 100 milliGRAM(s) Oral three times a day  heparin  Injectable 5000 Unit(s) SubCutaneous every 8 hours  latanoprost 0.005% Ophthalmic Solution 1 Drop(s) Both EYES at bedtime  metoprolol succinate ER 50 milliGRAM(s) Oral daily  polyethylene glycol 3350 17 Gram(s) Oral two times a day    MEDICATIONS  (PRN):      HOME MEDICATIONS:  Home Medications:  Accupril 5 mg oral tablet: 1 tab(s) orally once a day (05 Jul 2019 05:52)  furosemide 20 mg oral tablet: 1 tab(s) orally once a day (05 Jul 2019 05:52)  Iron 100 Plus oral tablet: 1 tab(s) orally once a day (05 Jul 2019 05:52)  latanoprost 0.005% ophthalmic solution: 1 drop(s) to each affected eye once a day (in the evening) (05 Jul 2019 05:52)  Lipitor 80 mg oral tablet: 1 tab(s) orally once a day (05 Jul 2019 05:52)  metoprolol succinate 50 mg oral tablet, extended release: 1 tab(s) orally once a day (05 Jul 2019 05:52)      ROS:     REVIEW OF SYSTEMS  General:  No fevers  Eyes:  No reported pain   ENT:  No sore throat   NECK: No stiffness   CV:  No chest pain   Resp:  No shortness of breath  GI: no vomiting, diarrhea, change in bowel habits  :  No dysuria  Muscle:  No weakness  Neuro:  No tingling  Endocrine:  No polyuria  Heme:  No ecchymosis          VITALS:   T(F): 97.2 (07-05 @ 07:47), Max: 98.9 (07-04 @ 22:34)  HR: 77 (07-05 @ 07:47) (70 - 86)  BP: 115/60 (07-05 @ 07:47) (115/60 - 153/70)  BP(mean): --  RR: 18 (07-05 @ 07:47) (16 - 18)  SpO2: 98% (07-05 @ 07:47) (96% - 99%)    I&O's Summary    04 Jul 2019 07:01  -  05 Jul 2019 07:00  --------------------------------------------------------  IN: 60 mL / OUT: 0 mL / NET: 60 mL        PHYSICAL EXAM:  EYES: No scleral icterus   LUNG: Clear to auscultation bilaterally;  HEART: Normal heart sounds  ABDOMEN: Soft, +BS, No Abdominal Tenderness, No guarding, No Guevara Sign   Rectal Exam: rectum loaded with stool which is black in color  Neuro: No focal deficits  Musculoskeletal: no joint pains or tenderness, no back tenderness    LABS:                        9.9    18.64 )-----------( 426      ( 04 Jul 2019 21:02 )             30.6     PT/INR - ( 04 Jul 2019 21:02 )  INR: 1.00            LIVER FUNCTIONS - ( 04 Jul 2019 21:02 )  Alb: 2.8 g/dL / Pro: 6.0 g/dL / ALK PHOS: 143 U/L / ALT: 63 U/L / AST: 63 U/L / GGT: x           07-04    133<L>  |  93<L>  |  50<H>  ----------------------------<  119<H>  5.3<H>   |  25  |  2.4<H>    Ca    10.1      04 Jul 2019 21:02      CARDIAC MARKERS ( 04 Jul 2019 21:02 )  x     / 0.08 ng/mL / x     / x     / x          Previous EGD: none    Previous colonoscopy: none      RADIOLOGY:  < from: CT Abdomen and Pelvis w/ Oral Cont (07.05.19 @ 02:09) >  IMPRESSION:   1. Ascending colon 9.8 cm long mass, suspicious for neoplasm.   2. Tumor abuts the second portion of duodenum.  3. Large rectal and colonic stool load, proximal and distal to mass. No   evidence of small bowel obstruction    < end of copied text > GI HPI:  Patient is a 89y old  Male who presents with a chief complaint of unsteadiness and falls.      Hospital course:  89 years old male with reported history of mild dementia, CAD, DM, HTN and chronic anemia, presented to the ED with progressive weakness. As per his daughter, patient has become more unsteady on ambulation and requires more assistance. Patient also has decreased appetite. Patient has fallen twice at home and they were both unwitnessed.  Family also reports increased work of breathing, especially on exertion. Patient himself has no acute complaints and reports good appetite. In ED, trauma work up was completed due to previous falls and an incidental findings of 9.8cm colonic mass is seen in ascending colon.    GI: As per the daughter, patient has been having severe constipation for the last few months which was progressively getting worse especially for the last few weeks. He often has large bowel movement once in a week which the family attributed to iron pills. not on laxatives. Patient was on iron supplements for iron deficiency anemia for many years one tab per day which was increased to 3 tablets per day 2 months ago because of worsening anemia. Color of stool was always black since the patient was started on iron supplements. 1 episode of non bloody, non bilious vomiting yesterday.  c/o subjective weight loss, decreased appetite in last 3 months.  No complaints of abdominal pain, nausea, rectal pain. No complaints of alexi bleeding in stool or change in color of stool.  Patient had colonoscopies in the past as part of routine screening and last one was more than 10yrs ago. Polyps removed as per the daughter but unsure about the results and doesn't remember where they were done. Father had colon cancer at the age of 83.  Incidental finding of 9.8cm colonic mass seen in ascending colon on CT abdomen with CBC showing iron deficiency anemia.     PAST MEDICAL & SURGICAL HISTORY  DM (diabetes mellitus)  HTN (hypertension)  Glaucoma  CAD (coronary artery disease)  No significant past surgical history      FAMILY HISTORY:  FAMILY HISTORY:  Colon cancer in father at the age of 83      SOCIAL HISTORY:  smoker: stopped 1 yr ago. Smoked 1/2 pc for 10yrs  Alcohol: Occasional alcoholic  Drug: no illicit drug use    ALLERGIES:  No Known Allergies      MEDICATIONS:  MEDICATIONS  (STANDING):  atorvastatin 80 milliGRAM(s) Oral at bedtime  chlorhexidine 4% Liquid 1 Application(s) Topical <User Schedule>  docusate sodium 100 milliGRAM(s) Oral three times a day  heparin  Injectable 5000 Unit(s) SubCutaneous every 8 hours  latanoprost 0.005% Ophthalmic Solution 1 Drop(s) Both EYES at bedtime  metoprolol succinate ER 50 milliGRAM(s) Oral daily  polyethylene glycol 3350 17 Gram(s) Oral two times a day    MEDICATIONS  (PRN):      HOME MEDICATIONS:  Home Medications:  Accupril 5 mg oral tablet: 1 tab(s) orally once a day (05 Jul 2019 05:52)  furosemide 20 mg oral tablet: 1 tab(s) orally once a day (05 Jul 2019 05:52)  Iron 100 Plus oral tablet: 1 tab(s) orally once a day (05 Jul 2019 05:52)  latanoprost 0.005% ophthalmic solution: 1 drop(s) to each affected eye once a day (in the evening) (05 Jul 2019 05:52)  Lipitor 80 mg oral tablet: 1 tab(s) orally once a day (05 Jul 2019 05:52)  metoprolol succinate 50 mg oral tablet, extended release: 1 tab(s) orally once a day (05 Jul 2019 05:52)      ROS:     REVIEW OF SYSTEMS  General:  No fevers  Eyes:  No reported pain   ENT:  No sore throat   NECK: No stiffness   CV:  No chest pain   Resp:  No shortness of breath  GI: constipation alternating with diarrhea  :  No dysuria  Muscle:  No weakness  Neuro:  No tingling  Endocrine:  No polyuria  Heme:  No ecchymosis          VITALS:   T(F): 97.2 (07-05 @ 07:47), Max: 98.9 (07-04 @ 22:34)  HR: 77 (07-05 @ 07:47) (70 - 86)  BP: 115/60 (07-05 @ 07:47) (115/60 - 153/70)  BP(mean): --  RR: 18 (07-05 @ 07:47) (16 - 18)  SpO2: 98% (07-05 @ 07:47) (96% - 99%)    I&O's Summary    04 Jul 2019 07:01  -  05 Jul 2019 07:00  --------------------------------------------------------  IN: 60 mL / OUT: 0 mL / NET: 60 mL        PHYSICAL EXAM:  EYES: No scleral icterus   LUNG: Clear to auscultation bilaterally;  HEART: Normal heart sounds  ABDOMEN: Soft, +BS, No Abdominal Tenderness, No guarding, No Guevara Sign   Rectal Exam: rectum loaded with stool which is black in color  Neuro: No focal deficits  Musculoskeletal: no joint pains or tenderness, no back tenderness    LABS:                        9.9    18.64 )-----------( 426      ( 04 Jul 2019 21:02 )             30.6     PT/INR - ( 04 Jul 2019 21:02 )  INR: 1.00            LIVER FUNCTIONS - ( 04 Jul 2019 21:02 )  Alb: 2.8 g/dL / Pro: 6.0 g/dL / ALK PHOS: 143 U/L / ALT: 63 U/L / AST: 63 U/L / GGT: x           07-04    133<L>  |  93<L>  |  50<H>  ----------------------------<  119<H>  5.3<H>   |  25  |  2.4<H>    Ca    10.1      04 Jul 2019 21:02      CARDIAC MARKERS ( 04 Jul 2019 21:02 )  x     / 0.08 ng/mL / x     / x     / x          Previous EGD: none    Previous colonoscopy: Last colonoscopy more than 10yrs ago      RADIOLOGY:  < from: CT Abdomen and Pelvis w/ Oral Cont (07.05.19 @ 02:09) >  IMPRESSION:   1. Ascending colon 9.8 cm long mass, suspicious for neoplasm.   2. Tumor abuts the second portion of duodenum.  3. Large rectal and colonic stool load, proximal and distal to mass. No   evidence of small bowel obstruction    < end of copied text > GI HPI:  Patient is a 89y old  Male who presents with a chief complaint of unsteadiness and falls.      Hospital course:  89 years old male with reported history of mild dementia, CAD, DM, HTN and chronic anemia, presented to the ED with progressive weakness. As per his daughter, patient has become more unsteady on ambulation and requires more assistance. Patient also has decreased appetite. Patient has fallen twice at home and they were both unwitnessed.  Family also reports increased work of breathing, especially on exertion. Patient himself has no acute complaints and reports good appetite. In ED, trauma work up was completed due to previous falls and an incidental findings of 9.8cm colonic mass is seen in ascending colon.    GI: As per the daughter, patient has been having severe constipation for the last few months which was progressively getting worse especially for the last few weeks. He often has large bowel movement once in a week which the family attributed to iron pills. not on laxatives. Patient was on iron supplements for iron deficiency anemia for many years one tab per day which was increased to 3 tablets per day 2 months ago because of worsening anemia. Color of stool was always black since the patient was started on iron supplements. 1 episode of non bloody, non bilious vomiting yesterday.  c/o subjective weight loss, decreased appetite in last 3 months.  No complaints of abdominal pain, nausea, rectal pain. No complaints of alexi bleeding in stool or change in color of stool.  Patient had colonoscopies in the past as part of routine screening and last one was more than 10yrs ago. Polyps removed as per the daughter but unsure about the results and doesn't remember where they were done. Father had colon cancer at the age of 83.  Incidental finding of 9.8cm colonic mass seen in ascending colon on CT abdomen with CBC showing iron deficiency anemia.     PAST MEDICAL & SURGICAL HISTORY  DM (diabetes mellitus)  HTN (hypertension)  Glaucoma  CAD (coronary artery disease)  No significant past surgical history      FAMILY HISTORY:  FAMILY HISTORY:  Colon cancer in father at the age of 83      SOCIAL HISTORY:  smoker: stopped 1 yr ago. Smoked 1/2 pc for 10yrs  Alcohol: Occasional alcoholic  Drug: no illicit drug use    ALLERGIES:  No Known Allergies      MEDICATIONS:  MEDICATIONS  (STANDING):  atorvastatin 80 milliGRAM(s) Oral at bedtime  chlorhexidine 4% Liquid 1 Application(s) Topical <User Schedule>  docusate sodium 100 milliGRAM(s) Oral three times a day  heparin  Injectable 5000 Unit(s) SubCutaneous every 8 hours  latanoprost 0.005% Ophthalmic Solution 1 Drop(s) Both EYES at bedtime  metoprolol succinate ER 50 milliGRAM(s) Oral daily  polyethylene glycol 3350 17 Gram(s) Oral two times a day    MEDICATIONS  (PRN):      HOME MEDICATIONS:  Home Medications:  Accupril 5 mg oral tablet: 1 tab(s) orally once a day (05 Jul 2019 05:52)  furosemide 20 mg oral tablet: 1 tab(s) orally once a day (05 Jul 2019 05:52)  Iron 100 Plus oral tablet: 1 tab(s) orally once a day (05 Jul 2019 05:52)  latanoprost 0.005% ophthalmic solution: 1 drop(s) to each affected eye once a day (in the evening) (05 Jul 2019 05:52)  Lipitor 80 mg oral tablet: 1 tab(s) orally once a day (05 Jul 2019 05:52)  metoprolol succinate 50 mg oral tablet, extended release: 1 tab(s) orally once a day (05 Jul 2019 05:52)      ROS:     REVIEW OF SYSTEMS  General:  No fevers  Eyes:  No reported pain   ENT:  No sore throat   NECK: No stiffness   CV:  No chest pain   Resp:  No shortness of breath  GI: constipation alternating with diarrhea  :  No dysuria  Muscle:  No weakness  Neuro:  No tingling  Endocrine:  No polyuria  Heme:  No ecchymosis          VITALS:   T(F): 97.2 (07-05 @ 07:47), Max: 98.9 (07-04 @ 22:34)  HR: 77 (07-05 @ 07:47) (70 - 86)  BP: 115/60 (07-05 @ 07:47) (115/60 - 153/70)  BP(mean): --  RR: 18 (07-05 @ 07:47) (16 - 18)  SpO2: 98% (07-05 @ 07:47) (96% - 99%)    I&O's Summary    04 Jul 2019 07:01  -  05 Jul 2019 07:00  --------------------------------------------------------  IN: 60 mL / OUT: 0 mL / NET: 60 mL        PHYSICAL EXAM:  EYES: No scleral icterus   neck no thyromegaly  LUNG: Clear to auscultation bilaterally;  HEART: Normal heart sounds  ABDOMEN: Soft, +BS, No Abdominal Tenderness, No guarding, No Guevara Sign   Rectal Exam: rectum loaded with stool which is black in color  Neuro: No focal deficits  Musculoskeletal: no joint pains or tenderness, no back tenderness    LABS:                        9.9    18.64 )-----------( 426      ( 04 Jul 2019 21:02 )             30.6     PT/INR - ( 04 Jul 2019 21:02 )  INR: 1.00            LIVER FUNCTIONS - ( 04 Jul 2019 21:02 )  Alb: 2.8 g/dL / Pro: 6.0 g/dL / ALK PHOS: 143 U/L / ALT: 63 U/L / AST: 63 U/L / GGT: x           07-04    133<L>  |  93<L>  |  50<H>  ----------------------------<  119<H>  5.3<H>   |  25  |  2.4<H>    Ca    10.1      04 Jul 2019 21:02      CARDIAC MARKERS ( 04 Jul 2019 21:02 )  x     / 0.08 ng/mL / x     / x     / x          Previous EGD: none    Previous colonoscopy: Last colonoscopy more than 10yrs ago      RADIOLOGY:  < from: CT Abdomen and Pelvis w/ Oral Cont (07.05.19 @ 02:09) >  IMPRESSION:   1. Ascending colon 9.8 cm long mass, suspicious for neoplasm.   2. Tumor abuts the second portion of duodenum.  3. Large rectal and colonic stool load, proximal and distal to mass. No   evidence of small bowel obstruction    < end of copied text >

## 2019-07-05 NOTE — ED ADULT NURSE REASSESSMENT NOTE - NS ED NURSE REASSESS COMMENT FT1
pt resting comfortably, denies any discomfort. no distress noted at this time. iv intact, safety maintained, on monitor. VSS. awaiting additional testing.

## 2019-07-05 NOTE — H&P ADULT - NSHPSOCIALHISTORY_GEN_ALL_CORE
Former smoker, Denies alcohol use, or illicit drug use      Lives with family, requires assistance on ADLs

## 2019-07-05 NOTE — CONSULT NOTE ADULT - SUBJECTIVE AND OBJECTIVE BOX
HPI:  89 years old male with reported with carotid stenosis, DM, and chronic anemia, presented to the ED with progressive weakness and swelling of the lower extremities. The patient denies any cardiac history. He does complain of dyspnea on exertion worsening over the last few weeks.   Review of old records showed a 2D echo done in 2014 with concentric hypertrophy and EF >55% without wall motion abnormalities.     In ED: 1L LR (05 Jul 2019 03:35)      PAST MEDICAL & SURGICAL HISTORY  DM (diabetes mellitus)  HTN (hypertension)  Glaucoma  No significant past surgical history      FAMILY HISTORY:  FAMILY HISTORY:  No pertinent family history in first degree relatives      SOCIAL HISTORY:  []Smoker  []Alcohol  []Drug    ALLERGIES:  No Known Allergies      MEDICATIONS:  MEDICATIONS  (STANDING):  atorvastatin 80 milliGRAM(s) Oral at bedtime  furosemide    Tablet 20 milliGRAM(s) Oral daily  latanoprost 0.005% Ophthalmic Solution 1 Drop(s) Both EYES at bedtime  lisinopril 5 milliGRAM(s) Oral daily  metoprolol succinate ER 50 milliGRAM(s) Oral daily    MEDICATIONS  (PRN):      HOME MEDICATIONS:  Home Medications:  Accupril 5 mg oral tablet: 1 tab(s) orally once a day (05 Jul 2019 05:52)  furosemide 20 mg oral tablet: 1 tab(s) orally once a day (05 Jul 2019 05:52)  Iron 100 Plus oral tablet: 1 tab(s) orally once a day (05 Jul 2019 05:52)  latanoprost 0.005% ophthalmic solution: 1 drop(s) to each affected eye once a day (in the evening) (05 Jul 2019 05:52)  Lipitor 80 mg oral tablet: 1 tab(s) orally once a day (05 Jul 2019 05:52)  metoprolol succinate 50 mg oral tablet, extended release: 1 tab(s) orally once a day (05 Jul 2019 05:52)      VITALS:   T(F): 97 (07-05 @ 04:23), Max: 98.9 (07-04 @ 22:34)  HR: 75 (07-05 @ 04:23) (70 - 86)  BP: 123/58 (07-05 @ 04:23) (123/58 - 153/70)  BP(mean): --  RR: 18 (07-05 @ 04:23) (16 - 18)  SpO2: 99% (07-05 @ 04:23) (96% - 99%)    I&O's Summary      REVIEW OF SYSTEMS:  CONSTITUTIONAL: Weakness  EYES/ENT: No visual changes;  No vertigo or throat pain   NECK: No pain or stiffness  RESPIRATORY: shortness of breath on exertion.  CARDIOVASCULAR: No chest pain or palpitations  GASTROINTESTINAL: No abdominal or epigastric pain. No nausea, vomiting, or hematemesis; No diarrhea or constipation. No melena or hematochezia.  GENITOURINARY: No dysuria, frequency or hematuria  NEUROLOGICAL: No numbness or weakness  SKIN: No itching, no rashes, LE swelling    PHYSICAL EXAM:  NEURO: patient is awake, alert and oriented (X2)  GEN: Not in acute distress  NECK: no thyroid enlargement, no JVD  LUNGS: Clear to auscultation bilaterally   CARDIOVASCULAR: S1/S2 present, RRR , no murmurs or rubs, no carotid bruits, + PP bilaterally  ABD: Soft, distended, +BS  EXT: +2 bilateral pedal edema  SKIN: Intact    LABS:                        9.9    18.64 )-----------( 426      ( 04 Jul 2019 21:02 )             30.6     07-04    133<L>  |  93<L>  |  50<H>  ----------------------------<  119<H>  5.3<H>   |  25  |  2.4<H>    Ca    10.1      04 Jul 2019 21:02    TPro  6.0  /  Alb  2.8<L>  /  TBili  0.4  /  DBili  x   /  AST  63<H>  /  ALT  63<H>  /  AlkPhos  143<H>  07-04    PT/INR - ( 04 Jul 2019 21:02 )   PT: 11.50 sec;   INR: 1.00 ratio           Troponin T, Serum: 0.08 ng/mL <HH> (07-04-19 @ 21:02)    CARDIAC MARKERS ( 04 Jul 2019 21:02 )  x     / 0.08 ng/mL / x     / x     / x            Serum Pro-Brain Natriuretic Peptide: 4817 pg/mL (07-04-19 @ 21:02)      RADIOLOGY:  -CXR: Small left pleural effusion  -TTE: NA  -CCTA: NA  -STRESS TEST: NA  -CATHETERIZATION: NA    ECG:    NSR at 91 bpm, infero-lateral Q-waves (present on old ECG). HPI:  89 years old male with reported with carotid stenosis, DM, and chronic anemia, presented to the ED with progressive weakness and swelling of the lower extremities. The patient denies any cardiac history. He does complain of dyspnea on exertion worsening over the last few weeks.   Review of old records showed a 2D echo done in 2014 with concentric hypertrophy and EF >55% without wall motion abnormalities.     In ED: 1L LR (05 Jul 2019 03:35)      PAST MEDICAL & SURGICAL HISTORY  DM (diabetes mellitus)  HTN (hypertension)  Glaucoma  No significant past surgical history      FAMILY HISTORY:  FAMILY HISTORY:  No pertinent family history in first degree relatives      SOCIAL HISTORY:  ex Smoker  no Alcohol  []Drug    ALLERGIES:  No Known Allergies      MEDICATIONS:  MEDICATIONS  (STANDING):  atorvastatin 80 milliGRAM(s) Oral at bedtime  furosemide    Tablet 20 milliGRAM(s) Oral daily  latanoprost 0.005% Ophthalmic Solution 1 Drop(s) Both EYES at bedtime  lisinopril 5 milliGRAM(s) Oral daily  metoprolol succinate ER 50 milliGRAM(s) Oral daily    MEDICATIONS  (PRN):      HOME MEDICATIONS:  Home Medications:  Accupril 5 mg oral tablet: 1 tab(s) orally once a day (05 Jul 2019 05:52)  furosemide 20 mg oral tablet: 1 tab(s) orally once a day (05 Jul 2019 05:52)  Iron 100 Plus oral tablet: 1 tab(s) orally once a day (05 Jul 2019 05:52)  latanoprost 0.005% ophthalmic solution: 1 drop(s) to each affected eye once a day (in the evening) (05 Jul 2019 05:52)  Lipitor 80 mg oral tablet: 1 tab(s) orally once a day (05 Jul 2019 05:52)  metoprolol succinate 50 mg oral tablet, extended release: 1 tab(s) orally once a day (05 Jul 2019 05:52)      VITALS:   T(F): 97 (07-05 @ 04:23), Max: 98.9 (07-04 @ 22:34)  HR: 75 (07-05 @ 04:23) (70 - 86)  BP: 123/58 (07-05 @ 04:23) (123/58 - 153/70)  BP(mean): --  RR: 18 (07-05 @ 04:23) (16 - 18)  SpO2: 99% (07-05 @ 04:23) (96% - 99%)    I&O's Summary      REVIEW OF SYSTEMS:  CONSTITUTIONAL: Weakness  EYES/ENT: No visual changes;  No vertigo or throat pain   NECK: No pain or stiffness  RESPIRATORY: shortness of breath on exertion.  CARDIOVASCULAR: No chest pain or palpitations  GASTROINTESTINAL: No abdominal or epigastric pain. No nausea, vomiting, or hematemesis; No diarrhea or constipation. No melena or hematochezia.  GENITOURINARY: No dysuria, frequency or hematuria  NEUROLOGICAL: No numbness or weakness  SKIN: No itching, no rashes, LE swelling    PHYSICAL EXAM:  NEURO: patient is awake, alert and oriented (X2)  GEN: Not in acute distress  NECK: no thyroid enlargement, no JVD  LUNGS: Clear to auscultation bilaterally   CARDIOVASCULAR: S1/S2 present, RRR , no murmurs or rubs, no carotid bruits, + PP bilaterally  ABD: Soft, distended, +BS  EXT/MS: +2 bilateral pedal edema  SKIN: Intact    LABS:                        9.9    18.64 )-----------( 426      ( 04 Jul 2019 21:02 )             30.6     07-04    133<L>  |  93<L>  |  50<H>  ----------------------------<  119<H>  5.3<H>   |  25  |  2.4<H>    Ca    10.1      04 Jul 2019 21:02    TPro  6.0  /  Alb  2.8<L>  /  TBili  0.4  /  DBili  x   /  AST  63<H>  /  ALT  63<H>  /  AlkPhos  143<H>  07-04    PT/INR - ( 04 Jul 2019 21:02 )   PT: 11.50 sec;   INR: 1.00 ratio           Troponin T, Serum: 0.08 ng/mL <HH> (07-04-19 @ 21:02)    CARDIAC MARKERS ( 04 Jul 2019 21:02 )  x     / 0.08 ng/mL / x     / x     / x            Serum Pro-Brain Natriuretic Peptide: 4817 pg/mL (07-04-19 @ 21:02)      RADIOLOGY:  -CXR: Small left pleural effusion  -TTE: NA  -CCTA: NA  -STRESS TEST: NA  -CATHETERIZATION: NA    ECG:    NSR at 91 bpm, infero-lateral Q-waves (present on old ECG).

## 2019-07-05 NOTE — H&P ADULT - NSHPREVIEWOFSYSTEMS_GEN_ALL_CORE
REVIEW OF SYSTEMS    CONSTITUTIONAL: No weakness, fevers or chills  RESPIRATORY: No cough, wheezing, hemoptysis; No shortness of breath  CARDIOVASCULAR: No chest pain or palpitations  GASTROINTESTINAL: No abdominal or epigastric pain. No nausea, vomiting, or hematemesis; No diarrhea or constipation. No melena or hematochezia.  GENITOURINARY: No dysuria, frequency or hematuria  NEUROLOGICAL: No numbness or weakness  SKIN: No itching, rashes CONSTITUTIONAL: No weakness, fevers or chills  RESPIRATORY: No cough, wheezing, hemoptysis; No shortness of breath  CARDIOVASCULAR: No chest pain or palpitations  GASTROINTESTINAL: No abdominal or epigastric pain. No nausea, vomiting, or hematemesis; No diarrhea or constipation. No melena or hematochezia.  GENITOURINARY: No dysuria, frequency or hematuria  NEUROLOGICAL: No numbness or weakness  SKIN: No itching, rashes

## 2019-07-05 NOTE — CONSULT NOTE ADULT - ASSESSMENT
Patient is a 89y old  Male who presents with a chief complaint of weakness.    DM (diabetes mellitus)  HTN (hypertension)  Glaucoma  CAD (coronary artery disease)  Carotid stenosis  CKD    # Elevated Troponins: (0.08)  - Probably due to underlying CAD and CKD  - Repeat 1 set of CE.   - 2D echo   - Continue metoprolol, ACE and lipitor. Hold off on starting ASA now until work-up for colon mass. Patient is a 89y old  Male who presents with a chief complaint of weakness.    DM (diabetes mellitus)  HTN (hypertension)  Glaucoma  CAD (coronary artery disease)  Carotid stenosis  CKD    # Elevated Troponins: (0.08)  - Probably due to underlying CAD and CKD  - Repeat 1 set of CE.   - 2D echo   - Continue metoprolol, ACE and lipitor. Start low dose ASA  - work-up for colon mass.

## 2019-07-06 LAB
ALBUMIN SERPL ELPH-MCNC: 2.6 G/DL — LOW (ref 3.5–5.2)
ALP SERPL-CCNC: 144 U/L — HIGH (ref 30–115)
ALT FLD-CCNC: 63 U/L — HIGH (ref 0–41)
ANION GAP SERPL CALC-SCNC: 14 MMOL/L — SIGNIFICANT CHANGE UP (ref 7–14)
AST SERPL-CCNC: 61 U/L — HIGH (ref 0–41)
BASOPHILS # BLD AUTO: 0.03 K/UL — SIGNIFICANT CHANGE UP (ref 0–0.2)
BASOPHILS NFR BLD AUTO: 0.2 % — SIGNIFICANT CHANGE UP (ref 0–1)
BILIRUB SERPL-MCNC: 0.4 MG/DL — SIGNIFICANT CHANGE UP (ref 0.2–1.2)
BUN SERPL-MCNC: 49 MG/DL — HIGH (ref 10–20)
CALCIUM SERPL-MCNC: 8.9 MG/DL — SIGNIFICANT CHANGE UP (ref 8.5–10.1)
CANCER AG19-9 SERPL-ACNC: 25 U/ML — SIGNIFICANT CHANGE UP
CEA SERPL-MCNC: 8.7 NG/ML — HIGH (ref 0–3.8)
CHLORIDE SERPL-SCNC: 95 MMOL/L — LOW (ref 98–110)
CO2 SERPL-SCNC: 23 MMOL/L — SIGNIFICANT CHANGE UP (ref 17–32)
CREAT SERPL-MCNC: 2.2 MG/DL — HIGH (ref 0.7–1.5)
CULTURE RESULTS: SIGNIFICANT CHANGE UP
EOSINOPHIL # BLD AUTO: 0.14 K/UL — SIGNIFICANT CHANGE UP (ref 0–0.7)
EOSINOPHIL NFR BLD AUTO: 0.8 % — SIGNIFICANT CHANGE UP (ref 0–8)
FERRITIN SERPL-MCNC: 125 NG/ML — SIGNIFICANT CHANGE UP (ref 30–400)
FOLATE SERPL-MCNC: >20 NG/ML — SIGNIFICANT CHANGE UP
GLUCOSE BLDC GLUCOMTR-MCNC: 180 MG/DL — HIGH (ref 70–99)
GLUCOSE BLDC GLUCOMTR-MCNC: 235 MG/DL — HIGH (ref 70–99)
GLUCOSE BLDC GLUCOMTR-MCNC: 275 MG/DL — HIGH (ref 70–99)
GLUCOSE SERPL-MCNC: 171 MG/DL — HIGH (ref 70–99)
HAPTOGLOB SERPL-MCNC: 319 MG/DL — HIGH (ref 34–200)
HCT VFR BLD CALC: 30.4 % — LOW (ref 42–52)
HGB BLD-MCNC: 9.9 G/DL — LOW (ref 14–18)
IMM GRANULOCYTES NFR BLD AUTO: 0.6 % — HIGH (ref 0.1–0.3)
LYMPHOCYTES # BLD AUTO: 1.68 K/UL — SIGNIFICANT CHANGE UP (ref 1.2–3.4)
LYMPHOCYTES # BLD AUTO: 9.5 % — LOW (ref 20.5–51.1)
MAGNESIUM SERPL-MCNC: 2.2 MG/DL — SIGNIFICANT CHANGE UP (ref 1.8–2.4)
MCHC RBC-ENTMCNC: 26.9 PG — LOW (ref 27–31)
MCHC RBC-ENTMCNC: 32.6 G/DL — SIGNIFICANT CHANGE UP (ref 32–37)
MCV RBC AUTO: 82.6 FL — SIGNIFICANT CHANGE UP (ref 80–94)
MONOCYTES # BLD AUTO: 0.93 K/UL — HIGH (ref 0.1–0.6)
MONOCYTES NFR BLD AUTO: 5.3 % — SIGNIFICANT CHANGE UP (ref 1.7–9.3)
NEUTROPHILS # BLD AUTO: 14.72 K/UL — HIGH (ref 1.4–6.5)
NEUTROPHILS NFR BLD AUTO: 83.6 % — HIGH (ref 42.2–75.2)
NRBC # BLD: 0 /100 WBCS — SIGNIFICANT CHANGE UP (ref 0–0)
PLATELET # BLD AUTO: 434 K/UL — HIGH (ref 130–400)
POTASSIUM SERPL-MCNC: 5.1 MMOL/L — HIGH (ref 3.5–5)
POTASSIUM SERPL-SCNC: 5.1 MMOL/L — HIGH (ref 3.5–5)
PROT SERPL-MCNC: 5.5 G/DL — LOW (ref 6–8)
RBC # BLD: 3.68 M/UL — LOW (ref 4.7–6.1)
RBC # FLD: 17 % — HIGH (ref 11.5–14.5)
SODIUM SERPL-SCNC: 132 MMOL/L — LOW (ref 135–146)
SPECIMEN SOURCE: SIGNIFICANT CHANGE UP
TROPONIN T SERPL-MCNC: 0.1 NG/ML — CRITICAL HIGH
VIT B12 SERPL-MCNC: 1241 PG/ML — SIGNIFICANT CHANGE UP (ref 232–1245)
WBC # BLD: 17.61 K/UL — HIGH (ref 4.8–10.8)
WBC # FLD AUTO: 17.61 K/UL — HIGH (ref 4.8–10.8)

## 2019-07-06 PROCEDURE — 99233 SBSQ HOSP IP/OBS HIGH 50: CPT

## 2019-07-06 RX ADMIN — HEPARIN SODIUM 5000 UNIT(S): 5000 INJECTION INTRAVENOUS; SUBCUTANEOUS at 18:38

## 2019-07-06 RX ADMIN — CHLORHEXIDINE GLUCONATE 1 APPLICATION(S): 213 SOLUTION TOPICAL at 06:13

## 2019-07-06 RX ADMIN — Medication 81 MILLIGRAM(S): at 12:43

## 2019-07-06 RX ADMIN — Medication 100 MILLIGRAM(S): at 21:09

## 2019-07-06 RX ADMIN — ATORVASTATIN CALCIUM 80 MILLIGRAM(S): 80 TABLET, FILM COATED ORAL at 21:09

## 2019-07-06 RX ADMIN — LATANOPROST 1 DROP(S): 0.05 SOLUTION/ DROPS OPHTHALMIC; TOPICAL at 21:09

## 2019-07-06 RX ADMIN — Medication 50 MILLIGRAM(S): at 06:12

## 2019-07-06 RX ADMIN — HEPARIN SODIUM 5000 UNIT(S): 5000 INJECTION INTRAVENOUS; SUBCUTANEOUS at 06:12

## 2019-07-06 RX ADMIN — LISINOPRIL 5 MILLIGRAM(S): 2.5 TABLET ORAL at 06:12

## 2019-07-06 RX ADMIN — HEPARIN SODIUM 5000 UNIT(S): 5000 INJECTION INTRAVENOUS; SUBCUTANEOUS at 21:09

## 2019-07-06 RX ADMIN — Medication 100 MILLIGRAM(S): at 06:12

## 2019-07-06 RX ADMIN — POLYETHYLENE GLYCOL 3350 17 GRAM(S): 17 POWDER, FOR SOLUTION ORAL at 18:39

## 2019-07-06 RX ADMIN — POLYETHYLENE GLYCOL 3350 17 GRAM(S): 17 POWDER, FOR SOLUTION ORAL at 06:12

## 2019-07-06 RX ADMIN — Medication 100 MILLIGRAM(S): at 18:38

## 2019-07-06 NOTE — PROGRESS NOTE ADULT - ASSESSMENT
89 years old male with reported history of CAD but refused stent, DM, and chronic anemia, presented to the ED with progressive weakness.    # Mass at ascending colon, likely malignancy  - GI rec appreciated.  -CT abdomen with oral contrast showed incidental 9.8cm long mass in ascending colon abuting second part of duodenum. Large rectal and colonic stool load seen proximal and distal to the mass.  -Patient needs disimpaction before colonoscopy. Soap water enemas every 4h, miralax, senna, colace until bowel is cleared off stool.   -Ultimately needs colonoscopy with biopsy to determine the nature of the mass  -Send tumor markers Ca 19-9 and CEA  -Patient and family agreed for colonoscopy after disimpaction    # CKD stage IV  - Cr 2.4, at baseline  - Non-oliguric  - Avoid nephrotoxic drug  - Will start gentle IVF 6 hours in advance before doing CT chest  - BMP daily    # Normocytic anemia  - No signs of acute bleeding  - Could be anemia with chronic diseases  - Check iron studies, hapto, LDH, rect, B12 and folate    # HTN  - Continue metoprolol 50mg Q24hrs, hold Lisinopril for now due to hyperkalemia    # CAD  - stable, BNP 4800  - Start Aspirin 81mg (through not in patient's home med)  - Continue Lipitor 80mg Q24hrs and Lasix 20mg Q24hrs  - Check ECHO    # T2DM  - Check A1C  - Monitor FS, start insulin basal/bolus if FS persistently > 180    # Leukocytosis  - No signs of systemic infection  - Could be reactive vs malignancy?  - Continue to monitor      DVT ppx: SubQ heparin  GI ppx: Not indicated  Diet: DASH  Activity: OOBTC  Code status: full code  Dispo: acute, pending GI recs 89 years old male with reported history of CAD but refused stent, DM, and chronic anemia, presented to the ED with progressive weakness.    # Mass at ascending colon, likely malignancy  - GI rec appreciated.  -CT abdomen with oral contrast showed incidental 9.8cm long mass in ascending colon abuting second part of duodenum. Large rectal and colonic stool load seen proximal and distal to the mass.  -Patient needs disimpaction before colonoscopy. Soap water enemas every 4h, miralax, senna, colace until bowel is cleared off stool.   -Ultimately needs colonoscopy with biopsy to determine the nature of the mass  -Send tumor markers Ca 19-9 and CEA  -Patient and family agreed for colonoscopy after disimpaction    #Hyponatremia  -check serum and urine osm, urine Na  -likely due to dehydration  -follow BMP    #Troponemia  -stable  -cardiology consult appreciated  -c/w aspirin    # CKD stage IV  - Cr 2.4, at baseline  - Non-oliguric  - Avoid nephrotoxic drug  - Will start gentle IVF 6 hours in advance before doing CT chest  - BMP daily    # Normocytic anemia  - No signs of acute bleeding  - Could be anemia with chronic diseases  - Check iron studies, hapto, LDH, rect, B12 and folate    # HTN  - Continue metoprolol 50mg Q24hrs, hold Lisinopril for now due to hyperkalemia    # CAD  - stable, BNP 4800  - Start Aspirin 81mg (through not in patient's home med)  - Continue Lipitor 80mg Q24hrs and Lasix 20mg Q24hrs  - Check ECHO    # T2DM  - Check A1C  - Monitor FS, start insulin basal/bolus if FS persistently > 180    # Leukocytosis  - No signs of systemic infection  - Could be reactive vs malignancy?  - Continue to monitor      DVT ppx: SubQ heparin  GI ppx: Not indicated  Diet: DASH  Activity: OOBTC  Code status: full code  Dispo: acute, pending GI recs

## 2019-07-06 NOTE — PROGRESS NOTE ADULT - SUBJECTIVE AND OBJECTIVE BOX
DAILY PROGRESS NOTE  ===========================================================  Patient Information:   Hospital Day:</STEWART SANTORO  /  89y  /  Male  /b> 1d /  MRN#: [id number]  Working / Admitting Diagnosis:  1. progressive weakness    |:::::::::::::::::::::::::::::| SUBJECTIVE |:::::::::::::::::::::::::::::::|  OVERNIGHT EVENTS:   No acute events noted. Had Several BM  Denies chest pain / SOB / palpitations / Nausea / Vomiting / constipation / diarrhea or abdominal pain.   ROS otherwise negative.    Past Medical History:   CAD (coronary artery disease)   DM (diabetes mellitus)   Glaucoma   HTN (hypertension).   No significant past surgical history.   No pertinent family history in first degree relatives.   No Pertinent Family History in first degree relatives of: NO family significant history.    ALLERGIES:  No Known Allergies    HOME MEDICATIONS:  Accupril 5 mg oral tablet: 1 tab(s) orally once a day (2019 05:52)  furosemide 20 mg oral tablet: 1 tab(s) orally once a day (2019 05:52)  Iron 100 Plus oral tablet: 1 tab(s) orally once a day (2019 05:52)  latanoprost 0.005% ophthalmic solution: 1 drop(s) to each affected eye once a day (in the evening) (2019 05:52)  Lipitor 80 mg oral tablet: 1 tab(s) orally once a day (2019 05:52)  metoprolol succinate 50 mg oral tablet, extended release: 1 tab(s) orally once a day (2019 05:52)      |:::::::::::::::::::::::::::| OBJECTIVE |:::::::::::::::::::::::::::|    VITAL SIGNS: Last 24 Hours  T(C): 35.6 (2019 06:16), Max: 35.6 (2019 06:16)  T(F): 96.1 (2019 06:16), Max: 96.1 (2019 06:16)  HR: 87 (2019 06:16) (87 - 93)  BP: 151/84 (2019 06:16) (108/57 - 151/84)  RR: 18 (2019 06:16) (18 - 18)  SpO2: 99% (2019 23:37) (99% - 99%)    19 @ 07:01  -  19 @ 07:00  --------------------------------------------------------  IN: 50 mL / OUT: 800 mL / NET: -750 mL      PHYSICAL EXAM:  GENERAL:   Awake, alert; NAD.  HEENT:  Head NC/AT; Conjunctivae pink, Sclera anicteric & non-injected; Oral mucosa moist.  NECK:   Supple.  CARDIO:   RRR; S1 & S2 audible  RESP:  No respiratory distress or accessory muscle use. CTAB  GI:   Soft/ NT/ND / No guarding; No rebound tenderness.  EXT:   Without any cyanosis, clubbing, rash, lesions or edema.     LAB RESULTS:                        9.9    17.61 )-----------( 434      ( 2019 06:45 )             30.4         132<L>  |  95<L>  |  49<H>  ----------------------------<  171<H>  5.1<H>   |  23  |  2.2<H>    Ca    8.9      2019 06:45  Mg     2.2         TPro  5.5<L>  /  Alb  2.6<L>  /  TBili  0.4  /  DBili  x   /  AST  61<H>  /  ALT  63<H>  /  AlkPhos  144<H>  07    PT/INR - ( 2019 21:02 )   PT: 11.50 sec;   INR: 1.00 ratio           Urinalysis Basic - ( 2019 01:45 )    Color: Yellow / Appearance: Clear / S.015 / pH: x  Gluc: x / Ketone: Negative  / Bili: Negative / Urobili: 0.2 mg/dL   Blood: x / Protein: Negative mg/dL / Nitrite: Negative   Leuk Esterase: Negative / RBC: x / WBC x   Sq Epi: x / Non Sq Epi: x / Bacteria: x        Troponin T, Serum: 0.10 ng/mL <HH> (19 @ 06:45)  Troponin T, Serum: 0.08 ng/mL <HH> (19 @ 21:23)    CARDIAC MARKERS ( 2019 06:45 )  x     / 0.10 ng/mL / x     / x     / x      CARDIAC MARKERS ( 2019 21:23 )  x     / 0.08 ng/mL / x     / x     / x      CARDIAC MARKERS ( 2019 21:02 )  x     / 0.08 ng/mL / x     / x     / x          MICROBIOLOGY:    Culture - Urine (collected 2019 01:45)  Source: .Urine Clean Catch (Midstream)  Final Report (2019 08:15):    <10,000 CFU/mL Normal Urogenital Tessy      RADIOLOGY:  Labs, Radiology, Cardiology, and Other Results: 9.9    	18.64 )-----------( 426      ( 2019 21:02 )  	           30.6   	07-    	133<L>  |  93<L>  |  50<H>  	----------------------------<  119<H>  	5.3<H>   |  25  |  2.4<H>    	Ca    10.1      2019 21:02    	TPro  6.0  /  Alb  2.8<L>  /  TBili  0.4  /  DBili  x   /  AST  63<H>  /  ALT  63<H>  /  AlkPhos  143<H>  07-    	PT/INR - ( 2019 21:02 )   PT: 11.50 sec;   INR: 1.00 ratio      	CARDIAC MARKERS ( 2019 21:02 )  	x     / 0.08 ng/mL / x     / x     / x        	Serum Pro-Brain Natriuretic Peptide: 4817 pg/mL (19 @ 21:02)    	Urinalysis Basic - ( 2019 01:45 )    	Color: Yellow / Appearance: Clear / S.015 / pH: x  	Gluc: x / Ketone: Negative  / Bili: Negative / Urobili: 0.2 mg/dL   	Blood: x / Protein: Negative mg/dL / Nitrite: Negative   	Leuk Esterase: Negative / RBC: x / WBC x   	Sq Epi: x / Non Sq Epi: x / Bacteria: x      	< from: CT Head No Cont (19 @ 22:38) >    	No CT evidence for acute intracranial pathology.    	< end of copied text >    	< from: CT Cervical Spine No Cont (19 @ 22:38) >    	No acute fracture or significant subluxation of the cervical spine.    	< end of copied text >    	< from: CT Abdomen and Pelvis w/ Oral Cont (19 @ 02:09) >    	1. Ascending colon 9.8 cm long mass, suspicious for neoplasm.   	2. Tumor abuts the second portion of duodenum.  	3. Large rectal and colonic stool load, proximal and distal to mass. No evidence of small bowel obstruction    	< end of copied text >    INPATIENT MEDICATIONS:  aspirin enteric coated 81 milliGRAM(s) Oral daily  atorvastatin 80 milliGRAM(s) Oral at bedtime  chlorhexidine 4% Liquid 1 Application(s) Topical <User Schedule>  docusate sodium 100 milliGRAM(s) Oral three times a day  heparin  Injectable 5000 Unit(s) SubCutaneous every 8 hours  latanoprost 0.005% Ophthalmic Solution 1 Drop(s) Both EYES at bedtime  lisinopril 5 milliGRAM(s) Oral daily  metoprolol succinate ER 50 milliGRAM(s) Oral daily  polyethylene glycol 3350 17 Gram(s) Oral two times a day    PRN MEDICATIONS    ------------------------------------------------------------------------------------------------------------ DAILY PROGRESS NOTE  ===========================================================  Patient Information:   Hospital Day:</STEWART SANTORO  /  89y  /  Male  /b> 1d /  MRN#: [id number]  Working / Admitting Diagnosis:  1. progressive weakness    |:::::::::::::::::::::::::::::| SUBJECTIVE |:::::::::::::::::::::::::::::::|  OVERNIGHT EVENTS:   No acute events noted. Had Several BM  Denies chest pain / SOB / palpitations / Nausea / Vomiting / constipation / diarrhea or abdominal pain.   ROS otherwise negative.    Past Medical History:   CAD (coronary artery disease)   DM (diabetes mellitus)   Glaucoma   HTN (hypertension).   No significant past surgical history.   No pertinent family history in first degree relatives.   No Pertinent Family History in first degree relatives of: NO family significant history.    ALLERGIES:  No Known Allergies    HOME MEDICATIONS:  Accupril 5 mg oral tablet: 1 tab(s) orally once a day (2019 05:52)  furosemide 20 mg oral tablet: 1 tab(s) orally once a day (2019 05:52)  Iron 100 Plus oral tablet: 1 tab(s) orally once a day (2019 05:52)  latanoprost 0.005% ophthalmic solution: 1 drop(s) to each affected eye once a day (in the evening) (2019 05:52)  Lipitor 80 mg oral tablet: 1 tab(s) orally once a day (2019 05:52)  metoprolol succinate 50 mg oral tablet, extended release: 1 tab(s) orally once a day (2019 05:52)      |:::::::::::::::::::::::::::| OBJECTIVE |:::::::::::::::::::::::::::|    VITAL SIGNS: Last 24 Hours  T(C): 35.6 (2019 06:16), Max: 35.6 (2019 06:16)  T(F): 96.1 (2019 06:16), Max: 96.1 (2019 06:16)  HR: 87 (2019 06:16) (87 - 93)  BP: 151/84 (2019 06:16) (108/57 - 151/84)  RR: 18 (2019 06:16) (18 - 18)  SpO2: 99% (2019 23:37) (99% - 99%)    19 @ 07:01  -  19 @ 07:00  --------------------------------------------------------  IN: 50 mL / OUT: 800 mL / NET: -750 mL      PHYSICAL EXAM:  GENERAL:   Awake, alert; NAD.  HEENT:  Head NC/AT; Conjunctivae pink, Sclera anicteric & non-injected; Oral mucosa moist.  NECK:   Supple.  CARDIO:   RRR; S1 & S2 audible  RESP:  No respiratory distress or accessory muscle use. CTAB  GI:   +BS Soft/ NT/ND / No guarding; No rebound tenderness.  EXT:   Without any cyanosis, clubbing, rash, lesions or edema.   PSYCH: refusing to answer questions  SKIN: no rashes    LAB RESULTS:                        9.9    17.61 )-----------( 434      ( 2019 06:45 )             30.4         132<L>  |  95<L>  |  49<H>  ----------------------------<  171<H>  5.1<H>   |  23  |  2.2<H>    Ca    8.9      2019 06:45  Mg     2.2         TPro  5.5<L>  /  Alb  2.6<L>  /  TBili  0.4  /  DBili  x   /  AST  61<H>  /  ALT  63<H>  /  AlkPhos  144<H>      PT/INR - ( 2019 21:02 )   PT: 11.50 sec;   INR: 1.00 ratio           Urinalysis Basic - ( 2019 01:45 )    Color: Yellow / Appearance: Clear / S.015 / pH: x  Gluc: x / Ketone: Negative  / Bili: Negative / Urobili: 0.2 mg/dL   Blood: x / Protein: Negative mg/dL / Nitrite: Negative   Leuk Esterase: Negative / RBC: x / WBC x   Sq Epi: x / Non Sq Epi: x / Bacteria: x        Troponin T, Serum: 0.10 ng/mL <HH> (19 @ 06:45)  Troponin T, Serum: 0.08 ng/mL <HH> (19 @ 21:23)    CARDIAC MARKERS ( 2019 06:45 )  x     / 0.10 ng/mL / x     / x     / x      CARDIAC MARKERS ( 2019 21:23 )  x     / 0.08 ng/mL / x     / x     / x      CARDIAC MARKERS ( 2019 21:02 )  x     / 0.08 ng/mL / x     / x     / x          MICROBIOLOGY:    Culture - Urine (collected 2019 01:45)  Source: .Urine Clean Catch (Midstream)  Final Report (2019 08:15):    <10,000 CFU/mL Normal Urogenital Tessy      RADIOLOGY:  Labs, Radiology, Cardiology, and Other Results: 9.9    	18.64 )-----------( 426      ( 2019 21:02 )  	           30.6   	    	133<L>  |  93<L>  |  50<H>  	----------------------------<  119<H>  	5.3<H>   |  25  |  2.4<H>    	Ca    10.1      2019 21:02    	TPro  6.0  /  Alb  2.8<L>  /  TBili  0.4  /  DBili  x   /  AST  63<H>  /  ALT  63<H>  /  AlkPhos  143<H>      	PT/INR - ( 2019 21:02 )   PT: 11.50 sec;   INR: 1.00 ratio      	CARDIAC MARKERS ( 2019 21:02 )  	x     / 0.08 ng/mL / x     / x     / x        	Serum Pro-Brain Natriuretic Peptide: 4817 pg/mL (19 @ 21:02)    	Urinalysis Basic - ( 2019 01:45 )    	Color: Yellow / Appearance: Clear / S.015 / pH: x  	Gluc: x / Ketone: Negative  / Bili: Negative / Urobili: 0.2 mg/dL   	Blood: x / Protein: Negative mg/dL / Nitrite: Negative   	Leuk Esterase: Negative / RBC: x / WBC x   	Sq Epi: x / Non Sq Epi: x / Bacteria: x      	< from: CT Head No Cont (19 @ 22:38) >    	No CT evidence for acute intracranial pathology.    	< end of copied text >    	< from: CT Cervical Spine No Cont (19 @ 22:38) >    	No acute fracture or significant subluxation of the cervical spine.    	< end of copied text >    	< from: CT Abdomen and Pelvis w/ Oral Cont (19 @ 02:09) >    	1. Ascending colon 9.8 cm long mass, suspicious for neoplasm.   	2. Tumor abuts the second portion of duodenum.  	3. Large rectal and colonic stool load, proximal and distal to mass. No evidence of small bowel obstruction    	< end of copied text >    INPATIENT MEDICATIONS:  aspirin enteric coated 81 milliGRAM(s) Oral daily  atorvastatin 80 milliGRAM(s) Oral at bedtime  chlorhexidine 4% Liquid 1 Application(s) Topical <User Schedule>  docusate sodium 100 milliGRAM(s) Oral three times a day  heparin  Injectable 5000 Unit(s) SubCutaneous every 8 hours  latanoprost 0.005% Ophthalmic Solution 1 Drop(s) Both EYES at bedtime  lisinopril 5 milliGRAM(s) Oral daily  metoprolol succinate ER 50 milliGRAM(s) Oral daily  polyethylene glycol 3350 17 Gram(s) Oral two times a day    PRN MEDICATIONS    ------------------------------------------------------------------------------------------------------------

## 2019-07-07 LAB
ALBUMIN SERPL ELPH-MCNC: 2.5 G/DL — LOW (ref 3.5–5.2)
ALP SERPL-CCNC: 140 U/L — HIGH (ref 30–115)
ALT FLD-CCNC: 58 U/L — HIGH (ref 0–41)
ANION GAP SERPL CALC-SCNC: 12 MMOL/L — SIGNIFICANT CHANGE UP (ref 7–14)
AST SERPL-CCNC: 44 U/L — HIGH (ref 0–41)
BILIRUB SERPL-MCNC: 0.2 MG/DL — SIGNIFICANT CHANGE UP (ref 0.2–1.2)
BUN SERPL-MCNC: 63 MG/DL — CRITICAL HIGH (ref 10–20)
CALCIUM SERPL-MCNC: 8.5 MG/DL — SIGNIFICANT CHANGE UP (ref 8.5–10.1)
CHLORIDE SERPL-SCNC: 94 MMOL/L — LOW (ref 98–110)
CO2 SERPL-SCNC: 24 MMOL/L — SIGNIFICANT CHANGE UP (ref 17–32)
CREAT SERPL-MCNC: 3.1 MG/DL — HIGH (ref 0.7–1.5)
GLUCOSE BLDC GLUCOMTR-MCNC: 183 MG/DL — HIGH (ref 70–99)
GLUCOSE BLDC GLUCOMTR-MCNC: 192 MG/DL — HIGH (ref 70–99)
GLUCOSE BLDC GLUCOMTR-MCNC: 251 MG/DL — HIGH (ref 70–99)
GLUCOSE BLDC GLUCOMTR-MCNC: 254 MG/DL — HIGH (ref 70–99)
GLUCOSE SERPL-MCNC: 252 MG/DL — HIGH (ref 70–99)
HCT VFR BLD CALC: 31.9 % — LOW (ref 42–52)
HGB BLD-MCNC: 10.2 G/DL — LOW (ref 14–18)
MAGNESIUM SERPL-MCNC: 2.3 MG/DL — SIGNIFICANT CHANGE UP (ref 1.8–2.4)
MCHC RBC-ENTMCNC: 26.7 PG — LOW (ref 27–31)
MCHC RBC-ENTMCNC: 32 G/DL — SIGNIFICANT CHANGE UP (ref 32–37)
MCV RBC AUTO: 83.5 FL — SIGNIFICANT CHANGE UP (ref 80–94)
NRBC # BLD: 0 /100 WBCS — SIGNIFICANT CHANGE UP (ref 0–0)
PLATELET # BLD AUTO: 366 K/UL — SIGNIFICANT CHANGE UP (ref 130–400)
POTASSIUM SERPL-MCNC: 4.8 MMOL/L — SIGNIFICANT CHANGE UP (ref 3.5–5)
POTASSIUM SERPL-SCNC: 4.8 MMOL/L — SIGNIFICANT CHANGE UP (ref 3.5–5)
PROT SERPL-MCNC: 5.5 G/DL — LOW (ref 6–8)
RBC # BLD: 3.82 M/UL — LOW (ref 4.7–6.1)
RBC # FLD: 17.2 % — HIGH (ref 11.5–14.5)
SODIUM SERPL-SCNC: 130 MMOL/L — LOW (ref 135–146)
TROPONIN T SERPL-MCNC: 0.13 NG/ML — CRITICAL HIGH
WBC # BLD: 12.43 K/UL — HIGH (ref 4.8–10.8)
WBC # FLD AUTO: 12.43 K/UL — HIGH (ref 4.8–10.8)

## 2019-07-07 PROCEDURE — 99233 SBSQ HOSP IP/OBS HIGH 50: CPT

## 2019-07-07 RX ORDER — DEXTROSE 50 % IN WATER 50 %
12.5 SYRINGE (ML) INTRAVENOUS ONCE
Refills: 0 | Status: DISCONTINUED | OUTPATIENT
Start: 2019-07-07 | End: 2019-07-11

## 2019-07-07 RX ORDER — SODIUM CHLORIDE 9 MG/ML
1000 INJECTION, SOLUTION INTRAVENOUS
Refills: 0 | Status: DISCONTINUED | OUTPATIENT
Start: 2019-07-07 | End: 2019-07-11

## 2019-07-07 RX ORDER — SODIUM CHLORIDE 9 MG/ML
500 INJECTION INTRAMUSCULAR; INTRAVENOUS; SUBCUTANEOUS
Refills: 0 | Status: DISCONTINUED | OUTPATIENT
Start: 2019-07-07 | End: 2019-07-11

## 2019-07-07 RX ORDER — DEXTROSE 50 % IN WATER 50 %
25 SYRINGE (ML) INTRAVENOUS ONCE
Refills: 0 | Status: DISCONTINUED | OUTPATIENT
Start: 2019-07-07 | End: 2019-07-11

## 2019-07-07 RX ORDER — GLUCAGON INJECTION, SOLUTION 0.5 MG/.1ML
1 INJECTION, SOLUTION SUBCUTANEOUS ONCE
Refills: 0 | Status: DISCONTINUED | OUTPATIENT
Start: 2019-07-07 | End: 2019-07-11

## 2019-07-07 RX ORDER — INSULIN LISPRO 100/ML
VIAL (ML) SUBCUTANEOUS
Refills: 0 | Status: DISCONTINUED | OUTPATIENT
Start: 2019-07-07 | End: 2019-07-11

## 2019-07-07 RX ORDER — INSULIN LISPRO 100/ML
3 VIAL (ML) SUBCUTANEOUS
Refills: 0 | Status: DISCONTINUED | OUTPATIENT
Start: 2019-07-07 | End: 2019-07-11

## 2019-07-07 RX ORDER — DEXTROSE 50 % IN WATER 50 %
15 SYRINGE (ML) INTRAVENOUS ONCE
Refills: 0 | Status: DISCONTINUED | OUTPATIENT
Start: 2019-07-07 | End: 2019-07-11

## 2019-07-07 RX ORDER — INSULIN GLARGINE 100 [IU]/ML
9 INJECTION, SOLUTION SUBCUTANEOUS AT BEDTIME
Refills: 0 | Status: DISCONTINUED | OUTPATIENT
Start: 2019-07-07 | End: 2019-07-11

## 2019-07-07 RX ADMIN — LATANOPROST 1 DROP(S): 0.05 SOLUTION/ DROPS OPHTHALMIC; TOPICAL at 22:08

## 2019-07-07 RX ADMIN — INSULIN GLARGINE 9 UNIT(S): 100 INJECTION, SOLUTION SUBCUTANEOUS at 22:09

## 2019-07-07 RX ADMIN — LISINOPRIL 5 MILLIGRAM(S): 2.5 TABLET ORAL at 06:16

## 2019-07-07 RX ADMIN — HEPARIN SODIUM 5000 UNIT(S): 5000 INJECTION INTRAVENOUS; SUBCUTANEOUS at 22:09

## 2019-07-07 RX ADMIN — Medication 100 MILLIGRAM(S): at 22:09

## 2019-07-07 RX ADMIN — Medication 100 MILLIGRAM(S): at 06:16

## 2019-07-07 RX ADMIN — Medication 100 MILLIGRAM(S): at 16:02

## 2019-07-07 RX ADMIN — ATORVASTATIN CALCIUM 80 MILLIGRAM(S): 80 TABLET, FILM COATED ORAL at 22:09

## 2019-07-07 RX ADMIN — Medication 3: at 17:28

## 2019-07-07 RX ADMIN — HEPARIN SODIUM 5000 UNIT(S): 5000 INJECTION INTRAVENOUS; SUBCUTANEOUS at 06:16

## 2019-07-07 RX ADMIN — POLYETHYLENE GLYCOL 3350 17 GRAM(S): 17 POWDER, FOR SOLUTION ORAL at 06:16

## 2019-07-07 RX ADMIN — Medication 50 MILLIGRAM(S): at 06:16

## 2019-07-07 RX ADMIN — HEPARIN SODIUM 5000 UNIT(S): 5000 INJECTION INTRAVENOUS; SUBCUTANEOUS at 16:03

## 2019-07-07 RX ADMIN — Medication 3 UNIT(S): at 17:28

## 2019-07-07 RX ADMIN — SODIUM CHLORIDE 30 MILLILITER(S): 9 INJECTION INTRAMUSCULAR; INTRAVENOUS; SUBCUTANEOUS at 19:40

## 2019-07-07 RX ADMIN — CHLORHEXIDINE GLUCONATE 1 APPLICATION(S): 213 SOLUTION TOPICAL at 06:16

## 2019-07-07 RX ADMIN — Medication 81 MILLIGRAM(S): at 16:02

## 2019-07-07 NOTE — DIETITIAN INITIAL EVALUATION ADULT. - DIET TYPE
DASH/TLC diet. Poor po intake reported per staff; declining most meals & consuming <25% of meals consumed.

## 2019-07-07 NOTE — PROGRESS NOTE ADULT - ASSESSMENT
89 years old male with reported history of CAD but refused stent, DM, and chronic anemia, presented to the ED with progressive weakness.    Weakness likely due to dementia progression, suspected GI malignancy  -case d/w pt's daughter-she and family would like to focus on comfort measures given overall poor performance status  -hospice consult  -case d/w GI earliest colonoscopy will be done is 7/9-will obtain hospice consult prior to colonoscopy and if pt is hospice appropriate will cancel procedure    Stool impaction  -no further BM  -abdomen soft  -c/w laxatives  -can d/c enemas    Hyponatremia  -serum and urine osm, urine Na pending  -encourage PO intake    Troponemia  -stable  -cardiology consult appreciated  -c/w aspirin     CKD stage IV  - Cr 2.4, at baseline  - Non-oliguric  - Avoid nephrotoxic drug    Normocytic anemia  -stable    HTN  - Continue metoprolol , hold Lisinopril for now due to hyperkalemia    CAD  - stable, BNP 4800  - Start Aspirin 81mg (through not in patient's home med)  - Continue Lipitor 80mg Q24hrs and Lasix 20mg Q24hrs    Hyperkalemia  -improved  -follow BMP    T2DM  -uncontrolled  -increase insulin    Leukocytosis  - No signs of systemic infection  - Could be reactive vs malignancy?  - Continue to monitor      #Progress Note Handoff:  Pending (specify):  hospice  Family discussion: d/w daughter via phone  Disposition: Home___/SNF___/Other________/Unknown at this time___x_____ 89 years old male with reported history of CAD but refused stent, DM, and chronic anemia, presented to the ED with progressive weakness.    Weakness likely due to dementia progression, suspected GI malignancy  -case d/w pt's daughter-she and family would like to focus on comfort measures given overall poor performance status  -hospice consult  -case d/w GI earliest colonoscopy will be done is 7/9-will obtain hospice consult prior to colonoscopy and if pt is hospice appropriate will cancel procedure    Stool impaction  -no further BM  -abdomen soft  -c/w laxatives  -can d/c enemas    Hyponatremia  -serum and urine osm, urine Na pending  -encourage PO intake    Troponemia  -stable  -cardiology consult appreciated  -c/w aspirin     CKD stage IV  - Cr 2.4, at baseline  - Non-oliguric  - Avoid nephrotoxic drug    Alzheimer's Dementia  -as per daughter pt is at his baseline  -waxes and wanes    Normocytic anemia  -stable    HTN  - Continue metoprolol , hold Lisinopril for now due to hyperkalemia    CAD  - stable, BNP 4800  - Start Aspirin 81mg (through not in patient's home med)  - Continue Lipitor 80mg Q24hrs and Lasix 20mg Q24hrs    Hyperkalemia  -improved  -follow BMP    T2DM  -uncontrolled  -increase insulin    Leukocytosis  - No signs of systemic infection  - Could be reactive vs malignancy?  - Continue to monitor      #Progress Note Handoff:  Pending (specify):  hospice  Family discussion: d/w daughter via phone  Disposition: Home___/SNF___/Other________/Unknown at this time___x_____

## 2019-07-07 NOTE — DIETITIAN INITIAL EVALUATION ADULT. - PHYSICAL APPEARANCE
BMI: 20.4. Confused, disoriented. Last BM 7/6. Fecal incontinence noted. 1+ edema (B/L ankle). Difficulty swallowing observed per staff. Skin: Skin tear to L arm; otherwise intact.

## 2019-07-07 NOTE — DIETITIAN INITIAL EVALUATION ADULT. - ENERGY NEEDS
*Nutrient needs below, unless goal becomes comfort care:  Energy: 9880-3103 kcal/day (MSJx1.2-1.3 AF)  Protein: 59-71 g/day (1-1.2 g/kg ABW) - will monitor kidney function vs. disease processes that may increase demand  Fluids: 1 mL/kcal or per LIP

## 2019-07-07 NOTE — DIETITIAN INITIAL EVALUATION ADULT. - ADD RECOMMEND
Recommendation: (1) Consult SLP services to evaluate swallow function given confusion & poor po intake. Diet order & consistency per SLP. (2) If pt to remain on po diet with thin liquids, order Ensure Pudding q24hrs + Glucerna q24hrs. (3) If comfort care initiated, consider removing restrictive diet modifiers (i.e. remove DASH/TLC diet modifiers and avoid ordering Consistent Carbohydrate diet modifier).

## 2019-07-07 NOTE — PROGRESS NOTE ADULT - SUBJECTIVE AND OBJECTIVE BOX
CHIEF COMPLAINT:    Patient is a 89y old  Male who presents with a chief complaint of     INTERVAL HPI/OVERNIGHT EVENTS:    Patient seen and examined at bedside. No acute overnight events occurred.    ROS: All other systems are negative.    Vital Signs:    T(F): 97.8 (07-07-19 @ 05:45), Max: 97.8 (07-07-19 @ 05:45)  HR: 98 (07-07-19 @ 05:45) (97 - 98)  BP: 113/53 (07-07-19 @ 05:45) (113/53 - 122/59)  RR: 18 (07-07-19 @ 05:45) (17 - 18)  SpO2: --  I&O's Summary    06 Jul 2019 07:01  -  07 Jul 2019 07:00  --------------------------------------------------------  IN: 0 mL / OUT: 250 mL / NET: -250 mL    07 Jul 2019 07:01  -  07 Jul 2019 13:44  --------------------------------------------------------  IN: 0 mL / OUT: 0 mL / NET: 0 mL      Daily     Daily   CAPILLARY BLOOD GLUCOSE      POCT Blood Glucose.: 254 mg/dL (07 Jul 2019 11:46)  POCT Blood Glucose.: 192 mg/dL (07 Jul 2019 08:09)  POCT Blood Glucose.: 235 mg/dL (06 Jul 2019 21:58)  POCT Blood Glucose.: 275 mg/dL (06 Jul 2019 17:54)      PHYSICAL EXAM:  GENERAL:  NAD  SKIN: No rashes or lesions  HEENT: Atraumatic. Normocephalic. Anicteric  NECK:  No JVD.   PULMONARY: Clear to ausculation bilaterally. No wheezing. No rales  CVS: Normal S1, S2. Regular rate and rhythm. No murmurs.  ABDOMEN/GI: Soft, Nontender, Nondistended; Bowel sounds are present  EXTREMITIES:  No edema B/L LE.  NEUROLOGIC:  No motor deficit.  PSYCH: Alert & oriented x 3, normal affect    Consultant(s) Notes Reviewed:  [x ] YES  [ ] NO  Care Discussed with Consultants/Other Providers [ x] YES  [ ] NO    LABS:                        9.9    17.61 )-----------( 434      ( 06 Jul 2019 06:45 )             30.4     07-06    132<L>  |  95<L>  |  49<H>  ----------------------------<  171<H>  5.1<H>   |  23  |  2.2<H>    Ca    8.9      06 Jul 2019 06:45  Mg     2.2     07-06    TPro  5.5<L>  /  Alb  2.6<L>  /  TBili  0.4  /  DBili  x   /  AST  61<H>  /  ALT  63<H>  /  AlkPhos  144<H>  07-06      Serum Pro-Brain Natriuretic Peptide: 4817 pg/mL (07-04-19 @ 21:02)    Trop 0.10, CKMB --, CK --, 07-06-19 @ 06:45  Trop 0.08, CKMB --, CK --, 07-05-19 @ 21:23  Trop 0.08, CKMB --, CK --, 07-04-19 @ 21:02      Culture - Urine (collected 05 Jul 2019 01:45)  Source: .Urine Clean Catch (Midstream)  Final Report (06 Jul 2019 08:15):    <10,000 CFU/mL Normal Urogenital Tessy        RADIOLOGY & ADDITIONAL TESTS:  Imaging or report Personally Reviewed:  [ ] YES  [ ] NO    EKG reviewed independently    Medications:  Standing  aspirin enteric coated 81 milliGRAM(s) Oral daily  atorvastatin 80 milliGRAM(s) Oral at bedtime  chlorhexidine 4% Liquid 1 Application(s) Topical <User Schedule>  dextrose 5%. 1000 milliLiter(s) IV Continuous <Continuous>  dextrose 50% Injectable 12.5 Gram(s) IV Push once  dextrose 50% Injectable 25 Gram(s) IV Push once  dextrose 50% Injectable 25 Gram(s) IV Push once  docusate sodium 100 milliGRAM(s) Oral three times a day  heparin  Injectable 5000 Unit(s) SubCutaneous every 8 hours  insulin glargine Injectable (LANTUS) 9 Unit(s) SubCutaneous at bedtime  insulin lispro (HumaLOG) corrective regimen sliding scale   SubCutaneous three times a day before meals  insulin lispro Injectable (HumaLOG) 3 Unit(s) SubCutaneous three times a day before meals  latanoprost 0.005% Ophthalmic Solution 1 Drop(s) Both EYES at bedtime  lisinopril 5 milliGRAM(s) Oral daily  metoprolol succinate ER 50 milliGRAM(s) Oral daily  polyethylene glycol 3350 17 Gram(s) Oral two times a day    PRN Meds  dextrose 40% Gel 15 Gram(s) Oral once PRN  glucagon  Injectable 1 milliGRAM(s) IntraMuscular once PRN      Case discussed with resident  Care discussed with pt

## 2019-07-07 NOTE — CHART NOTE - NSCHARTNOTEFT_GEN_A_CORE
repeat labs this afternoon showing EDNA on CKD (creatinine 3.1 vs 2.2 yesterday) , hyponatremia 130 and elevated BUN   Patient has been having poor PO intake per RN, oliguric. On physical exam decreased breath sounds in bilateral bases, mild lower extremity edema   EDNA might be secondary to dehydration and poor PO intake   - Lisinopril discontinued   - Patient started on gentle hydration NS @ 30 mL/h   - US kidney and bladder ordered to rule out hydronephrosis and retention. Bladder scan ordered as well   - chest x ray in AM ordered to assess for volume overload   Of note patient has moderate to severe AS findings on 2D echo so please monitor for any signs of respiratory distress   Follow up AM BMP and avoid nephrotoxic drugs

## 2019-07-07 NOTE — DIETITIAN INITIAL EVALUATION ADULT. - OTHER INFO
Pertinent Medical Information: p/w  progressive weakness. Weakness likely due to dementia progression, suspected GI malignancy. Per GI, earliest colonoscopy will be done is 7/9 plan to obtain hospice consult prior to colonoscopy and if pt is hospice appropriate, plan to cancel procedure. Stool impaction noted. Hyponatremia noted. CKD stage IV, non-oliguric. Alzheimer's Dementia. Leukocytosis noted. DM noted uncontrolled. Hyperkalemia noted improved. Normocytic anemia noted stable.    Pertinent Subjective Information: Unable to obtain nutrition history at this time. Attempted to call daughter, but was told "right now is not a good time". NKFA per chart. Unable to perform nutrition focused physical exam at this time.

## 2019-07-08 LAB
ALBUMIN SERPL ELPH-MCNC: 2.2 G/DL — LOW (ref 3.5–5.2)
ALP SERPL-CCNC: 117 U/L — HIGH (ref 30–115)
ALT FLD-CCNC: 49 U/L — HIGH (ref 0–41)
ANION GAP SERPL CALC-SCNC: 11 MMOL/L — SIGNIFICANT CHANGE UP (ref 7–14)
AST SERPL-CCNC: 43 U/L — HIGH (ref 0–41)
BASOPHILS # BLD AUTO: 0.01 K/UL — SIGNIFICANT CHANGE UP (ref 0–0.2)
BASOPHILS NFR BLD AUTO: 0.1 % — SIGNIFICANT CHANGE UP (ref 0–1)
BILIRUB SERPL-MCNC: 0.3 MG/DL — SIGNIFICANT CHANGE UP (ref 0.2–1.2)
BUN SERPL-MCNC: 72 MG/DL — CRITICAL HIGH (ref 10–20)
CALCIUM SERPL-MCNC: 8.1 MG/DL — LOW (ref 8.5–10.1)
CHLORIDE SERPL-SCNC: 98 MMOL/L — SIGNIFICANT CHANGE UP (ref 98–110)
CO2 SERPL-SCNC: 26 MMOL/L — SIGNIFICANT CHANGE UP (ref 17–32)
CREAT SERPL-MCNC: 3.4 MG/DL — HIGH (ref 0.7–1.5)
EOSINOPHIL # BLD AUTO: 0.13 K/UL — SIGNIFICANT CHANGE UP (ref 0–0.7)
EOSINOPHIL NFR BLD AUTO: 1 % — SIGNIFICANT CHANGE UP (ref 0–8)
ESTIMATED AVERAGE GLUCOSE: 123 MG/DL — HIGH (ref 68–114)
GLUCOSE BLDC GLUCOMTR-MCNC: 109 MG/DL — HIGH (ref 70–99)
GLUCOSE BLDC GLUCOMTR-MCNC: 114 MG/DL — HIGH (ref 70–99)
GLUCOSE BLDC GLUCOMTR-MCNC: 158 MG/DL — HIGH (ref 70–99)
GLUCOSE BLDC GLUCOMTR-MCNC: 80 MG/DL — SIGNIFICANT CHANGE UP (ref 70–99)
GLUCOSE SERPL-MCNC: 102 MG/DL — HIGH (ref 70–99)
HBA1C BLD-MCNC: 5.9 % — HIGH (ref 4–5.6)
HCT VFR BLD CALC: 26.9 % — LOW (ref 42–52)
HGB BLD-MCNC: 8.7 G/DL — LOW (ref 14–18)
IMM GRANULOCYTES NFR BLD AUTO: 0.6 % — HIGH (ref 0.1–0.3)
LYMPHOCYTES # BLD AUTO: 1.11 K/UL — LOW (ref 1.2–3.4)
LYMPHOCYTES # BLD AUTO: 8.3 % — LOW (ref 20.5–51.1)
MAGNESIUM SERPL-MCNC: 2.2 MG/DL — SIGNIFICANT CHANGE UP (ref 1.8–2.4)
MCHC RBC-ENTMCNC: 26.9 PG — LOW (ref 27–31)
MCHC RBC-ENTMCNC: 32.3 G/DL — SIGNIFICANT CHANGE UP (ref 32–37)
MCV RBC AUTO: 83.3 FL — SIGNIFICANT CHANGE UP (ref 80–94)
MONOCYTES # BLD AUTO: 0.71 K/UL — HIGH (ref 0.1–0.6)
MONOCYTES NFR BLD AUTO: 5.3 % — SIGNIFICANT CHANGE UP (ref 1.7–9.3)
NEUTROPHILS # BLD AUTO: 11.28 K/UL — HIGH (ref 1.4–6.5)
NEUTROPHILS NFR BLD AUTO: 84.7 % — HIGH (ref 42.2–75.2)
NRBC # BLD: 0 /100 WBCS — SIGNIFICANT CHANGE UP (ref 0–0)
PLATELET # BLD AUTO: 313 K/UL — SIGNIFICANT CHANGE UP (ref 130–400)
POTASSIUM SERPL-MCNC: 5.3 MMOL/L — HIGH (ref 3.5–5)
POTASSIUM SERPL-SCNC: 5.3 MMOL/L — HIGH (ref 3.5–5)
PROT SERPL-MCNC: 4.9 G/DL — LOW (ref 6–8)
RBC # BLD: 3.23 M/UL — LOW (ref 4.7–6.1)
RBC # FLD: 17.2 % — HIGH (ref 11.5–14.5)
SODIUM SERPL-SCNC: 135 MMOL/L — SIGNIFICANT CHANGE UP (ref 135–146)
TROPONIN T SERPL-MCNC: 0.13 NG/ML — CRITICAL HIGH
WBC # BLD: 13.32 K/UL — HIGH (ref 4.8–10.8)
WBC # FLD AUTO: 13.32 K/UL — HIGH (ref 4.8–10.8)

## 2019-07-08 PROCEDURE — 76770 US EXAM ABDO BACK WALL COMP: CPT | Mod: 26

## 2019-07-08 PROCEDURE — 71045 X-RAY EXAM CHEST 1 VIEW: CPT | Mod: 26

## 2019-07-08 PROCEDURE — 99232 SBSQ HOSP IP/OBS MODERATE 35: CPT

## 2019-07-08 PROCEDURE — 99233 SBSQ HOSP IP/OBS HIGH 50: CPT

## 2019-07-08 RX ORDER — TAMSULOSIN HYDROCHLORIDE 0.4 MG/1
0.4 CAPSULE ORAL AT BEDTIME
Refills: 0 | Status: DISCONTINUED | OUTPATIENT
Start: 2019-07-08 | End: 2019-07-11

## 2019-07-08 RX ADMIN — Medication 100 MILLIGRAM(S): at 21:05

## 2019-07-08 RX ADMIN — ATORVASTATIN CALCIUM 80 MILLIGRAM(S): 80 TABLET, FILM COATED ORAL at 21:05

## 2019-07-08 RX ADMIN — TAMSULOSIN HYDROCHLORIDE 0.4 MILLIGRAM(S): 0.4 CAPSULE ORAL at 21:05

## 2019-07-08 RX ADMIN — Medication 3 UNIT(S): at 17:16

## 2019-07-08 RX ADMIN — CHLORHEXIDINE GLUCONATE 1 APPLICATION(S): 213 SOLUTION TOPICAL at 06:03

## 2019-07-08 RX ADMIN — Medication 81 MILLIGRAM(S): at 13:48

## 2019-07-08 RX ADMIN — HEPARIN SODIUM 5000 UNIT(S): 5000 INJECTION INTRAVENOUS; SUBCUTANEOUS at 13:47

## 2019-07-08 RX ADMIN — Medication 1: at 17:17

## 2019-07-08 RX ADMIN — SODIUM CHLORIDE 50 MILLILITER(S): 9 INJECTION INTRAMUSCULAR; INTRAVENOUS; SUBCUTANEOUS at 18:07

## 2019-07-08 RX ADMIN — Medication 3 UNIT(S): at 12:07

## 2019-07-08 RX ADMIN — Medication 100 MILLIGRAM(S): at 06:04

## 2019-07-08 RX ADMIN — LATANOPROST 1 DROP(S): 0.05 SOLUTION/ DROPS OPHTHALMIC; TOPICAL at 21:05

## 2019-07-08 RX ADMIN — Medication 3 UNIT(S): at 08:26

## 2019-07-08 RX ADMIN — HEPARIN SODIUM 5000 UNIT(S): 5000 INJECTION INTRAVENOUS; SUBCUTANEOUS at 21:05

## 2019-07-08 RX ADMIN — Medication 50 MILLIGRAM(S): at 06:04

## 2019-07-08 RX ADMIN — HEPARIN SODIUM 5000 UNIT(S): 5000 INJECTION INTRAVENOUS; SUBCUTANEOUS at 06:03

## 2019-07-08 RX ADMIN — INSULIN GLARGINE 9 UNIT(S): 100 INJECTION, SOLUTION SUBCUTANEOUS at 21:50

## 2019-07-08 NOTE — CONSULT NOTE ADULT - SUBJECTIVE AND OBJECTIVE BOX
88 y/o male dm, htn, ckd brought in for lethargy now with decreased urine output, unable to place subramanian.    Vital Signs Last 24 Hrs  T(C): 36.1 (08 Jul 2019 13:54), Max: 36.7 (08 Jul 2019 05:53)  T(F): 97 (08 Jul 2019 13:54), Max: 98.1 (08 Jul 2019 05:53)  HR: 84 (08 Jul 2019 13:54) (84 - 94)  BP: 93/50 (08 Jul 2019 13:54) (93/50 - 123/56)  BP(mean): --  RR: 20 (08 Jul 2019 13:54) (18 - 20)  SpO2: 100% (08 Jul 2019 06:01) (100% - 100%)                          8.7    13.32 )-----------( 313      ( 08 Jul 2019 08:08 )             26.9   07-08    135  |  98  |  72<HH>  ----------------------------<  102<H>  5.3<H>   |  26  |  3.4<H>        Ca    8.1<L>      08 Jul 2019 08:08  Mg     2.2     07-08    TPro  4.9<L>  /  Alb  2.2<L>  /  TBili  0.3  /  DBili  x   /  AST  43<H>  /  ALT  49<H>  /  AlkPhos  117<H>  07-08      neuro: pt alert but confused  abd suprapubic tenderness, mild bladder distention  gu: uncircumcised male, easily retractable foreskin with blood at the meatus    a/p  88 y/o make with acute on chronic kidney disease possibly due to  post obstruction with difficult subramanian placement.  Pt unsure if he has a urologist, no previous urology note in system.     - keep subramanian for now  - flomax if not contraindicated  - continue monitoring renal function   - will d/w attending  - see procedure note

## 2019-07-08 NOTE — PROCEDURE NOTE - NSURITECHNIQUE_GU_A_CORE
The collection bag is below the level of the patient and urinary bladder/The catheter was appropriately lubricated/Proper hand hygiene was performed/Sterile gloves were worn for the duration of the procedure/A sterile drape was used to cover all adjacent areas/The catheter was secured with a securement device (e.g. StatLock)/The urinary drainage system is closed at the end of the procedure/The site was cleaned with soap/water and sterile solution (betadine)/All applicable medical record documentation is completed

## 2019-07-08 NOTE — PROGRESS NOTE ADULT - SUBJECTIVE AND OBJECTIVE BOX
GI HPI Today:  Patient is a 89y old  Male who presents with a chief complaint of .  Patient is seen for ascending colon mass r/o malignancy / anemia.  patient has no melena, hematochezia , or hematemesis, no abdominal pain, no constipation.       PAST MEDICAL & SURGICAL HISTORY  DM (diabetes mellitus)  HTN (hypertension)  Glaucoma  CAD (coronary artery disease)  No significant past surgical history      ALLERGIES:  No Known Allergies      MEDICATIONS:  MEDICATIONS  (STANDING):  aspirin enteric coated 81 milliGRAM(s) Oral daily  atorvastatin 80 milliGRAM(s) Oral at bedtime  chlorhexidine 4% Liquid 1 Application(s) Topical <User Schedule>  dextrose 5%. 1000 milliLiter(s) (50 mL/Hr) IV Continuous <Continuous>  dextrose 50% Injectable 12.5 Gram(s) IV Push once  dextrose 50% Injectable 25 Gram(s) IV Push once  dextrose 50% Injectable 25 Gram(s) IV Push once  docusate sodium 100 milliGRAM(s) Oral three times a day  heparin  Injectable 5000 Unit(s) SubCutaneous every 8 hours  insulin glargine Injectable (LANTUS) 9 Unit(s) SubCutaneous at bedtime  insulin lispro (HumaLOG) corrective regimen sliding scale   SubCutaneous three times a day before meals  insulin lispro Injectable (HumaLOG) 3 Unit(s) SubCutaneous three times a day before meals  latanoprost 0.005% Ophthalmic Solution 1 Drop(s) Both EYES at bedtime  metoprolol succinate ER 50 milliGRAM(s) Oral daily  polyethylene glycol 3350 17 Gram(s) Oral two times a day  sodium chloride 0.9%. 500 milliLiter(s) (30 mL/Hr) IV Continuous <Continuous>    MEDICATIONS  (PRN):  dextrose 40% Gel 15 Gram(s) Oral once PRN Blood Glucose LESS THAN 70 milliGRAM(s)/deciliter  glucagon  Injectable 1 milliGRAM(s) IntraMuscular once PRN Glucose LESS THAN 70 milligrams/deciliter      REVIEW OF SYSTEMS  General:  No fevers  Eyes:  No reported pain   ENT:  No sore throat   NECK: No stiffness   CV:  No chest pain   Resp:  No shortness of breath  GI:  See HPI  :  No dysuria  Muscle:  No weakness  Neuro:  No tingling  Endocrine:  No polyuria  Heme:  No ecchymosis        VITALS:   T(F): 98.1 (07-08 @ 05:53), Max: 98.1 (07-08 @ 05:53)  HR: 93 (07-08 @ 06:01) (66 - 98)  BP: 123/56 (07-08 @ 06:01) (95/51 - 151/84)  BP(mean): --  RR: 18 (07-08 @ 05:53) (17 - 20)  SpO2: 100% (07-08 @ 06:01) (98% - 100%)        PHYSICAL EXAM:  General: looks comfortable , no   EYES: No scleral icterus   LUNG: Clear to auscultation bilaterally; No rales, rhonchi, wheezing, or rubs  HEART: RRR; No murmurs  ABDOMEN: Soft, +BS, no  Abdominal Tenderness, No guarding, No Guevara Sign   Rectal Exam:      Blood Work :                        8.7    13.32 )-----------( 313      ( 08 Jul 2019 08:08 )             26.9     PT/INR - ( 04 Jul 2019 21:02 )  INR: 1.00            07-08    135  |  98  |  72<HH>  ----------------------------<  102<H>  5.3<H>   |  26  |  3.4<H>    Ca    8.1<L>      08 Jul 2019 08:08  Mg     2.2     07-08      CBC -  ( 08 Jul 2019 08:08 )  Hemoglobin : 8.7    CBC -  ( 07 Jul 2019 17:53 )  Hemoglobin : 10.2    CBC -  ( 06 Jul 2019 06:45 )  Hemoglobin : 9.9    CBC -  ( 05 Jul 2019 11:21 )  Hemoglobin : 9.0    CBC -  ( 04 Jul 2019 21:02 )  Hemoglobin : 9.9      LIVER FUNCTIONS - ( 08 Jul 2019 08:08 )  Alb: 2.2 [3.5 - 5.2] / Pro: 4.9 [6.0 - 8.0] / ALK PHOS: 117 [30 - 115] / ALT: 49 [0 - 41] / AST: 43 [0 - 41] / GGT: x     LIVER FUNCTIONS - ( 07 Jul 2019 17:53 )  Alb: 2.5 [3.5 - 5.2] / Pro: 5.5 [6.0 - 8.0] / ALK PHOS: 140 [30 - 115] / ALT: 58 [0 - 41] / AST: 44 [0 - 41] / GGT: x     LIVER FUNCTIONS - ( 06 Jul 2019 06:45 )  Alb: 2.6 [3.5 - 5.2] / Pro: 5.5 [6.0 - 8.0] / ALK PHOS: 144 [30 - 115] / ALT: 63 [0 - 41] / AST: 61 [0 - 41] / GGT: x     LIVER FUNCTIONS - ( 05 Jul 2019 11:21 )  Alb: 2.4 [3.5 - 5.2] / Pro: 4.9 [6.0 - 8.0] / ALK PHOS: 124 [30 - 115] / ALT: 57 [0 - 41] / AST: 54 [0 - 41] / GGT: x     LIVER FUNCTIONS - ( 04 Jul 2019 21:02 )  Alb: 2.8 [3.5 - 5.2] / Pro: 6.0 [6.0 - 8.0] / ALK PHOS: 143 [30 - 115] / ALT: 63 [0 - 41] / AST: 63 [0 - 41] / GGT: x         RADIOLOGY: GI HPI Today:  Patient is a 89y old  Male who presents with a chief complaint of .  Patient is seen for ascending colon mass r/o malignancy / anemia.  patient has no melena, hematochezia , or hematemesis, no abdominal pain, no constipation.       PAST MEDICAL & SURGICAL HISTORY  DM (diabetes mellitus)  HTN (hypertension)  Glaucoma  CAD (coronary artery disease)  No significant past surgical history      ALLERGIES:  No Known Allergies      MEDICATIONS:  MEDICATIONS  (STANDING):  aspirin enteric coated 81 milliGRAM(s) Oral daily  atorvastatin 80 milliGRAM(s) Oral at bedtime  chlorhexidine 4% Liquid 1 Application(s) Topical <User Schedule>  dextrose 5%. 1000 milliLiter(s) (50 mL/Hr) IV Continuous <Continuous>  dextrose 50% Injectable 12.5 Gram(s) IV Push once  dextrose 50% Injectable 25 Gram(s) IV Push once  dextrose 50% Injectable 25 Gram(s) IV Push once  docusate sodium 100 milliGRAM(s) Oral three times a day  heparin  Injectable 5000 Unit(s) SubCutaneous every 8 hours  insulin glargine Injectable (LANTUS) 9 Unit(s) SubCutaneous at bedtime  insulin lispro (HumaLOG) corrective regimen sliding scale   SubCutaneous three times a day before meals  insulin lispro Injectable (HumaLOG) 3 Unit(s) SubCutaneous three times a day before meals  latanoprost 0.005% Ophthalmic Solution 1 Drop(s) Both EYES at bedtime  metoprolol succinate ER 50 milliGRAM(s) Oral daily  polyethylene glycol 3350 17 Gram(s) Oral two times a day  sodium chloride 0.9%. 500 milliLiter(s) (30 mL/Hr) IV Continuous <Continuous>    MEDICATIONS  (PRN):  dextrose 40% Gel 15 Gram(s) Oral once PRN Blood Glucose LESS THAN 70 milliGRAM(s)/deciliter  glucagon  Injectable 1 milliGRAM(s) IntraMuscular once PRN Glucose LESS THAN 70 milligrams/deciliter      REVIEW OF SYSTEMS  General:  No fevers  Eyes:  No reported pain   ENT:  No sore throat   NECK: No stiffness   CV:  No chest pain   Resp:  No shortness of breath  GI:  See HPI  :  No dysuria  Muscle:  No weakness  Neuro:  No tingling  Endocrine:  No polyuria  Heme:  No ecchymosis        VITALS:   T(F): 98.1 (07-08 @ 05:53), Max: 98.1 (07-08 @ 05:53)  HR: 93 (07-08 @ 06:01) (66 - 98)  BP: 123/56 (07-08 @ 06:01) (95/51 - 151/84)  BP(mean): --  RR: 18 (07-08 @ 05:53) (17 - 20)  SpO2: 100% (07-08 @ 06:01) (98% - 100%)        PHYSICAL EXAM:    GEN: No acute distress  LUNGS: Clear to auscultation bilaterally   HEART: S1/S2 present. RRR.   ABD/ GI: Soft, non-tender, non-distended. Bowel sounds present  EXT: NC/NC/NE/2+PP/ROSS  NEURO: AAOX3     Blood Work :                        8.7    13.32 )-----------( 313      ( 08 Jul 2019 08:08 )             26.9     PT/INR - ( 04 Jul 2019 21:02 )  INR: 1.00            07-08    135  |  98  |  72<HH>  ----------------------------<  102<H>  5.3<H>   |  26  |  3.4<H>    Ca    8.1<L>      08 Jul 2019 08:08  Mg     2.2     07-08      CBC -  ( 08 Jul 2019 08:08 )  Hemoglobin : 8.7    CBC -  ( 07 Jul 2019 17:53 )  Hemoglobin : 10.2    CBC -  ( 06 Jul 2019 06:45 )  Hemoglobin : 9.9    CBC -  ( 05 Jul 2019 11:21 )  Hemoglobin : 9.0    CBC -  ( 04 Jul 2019 21:02 )  Hemoglobin : 9.9      LIVER FUNCTIONS - ( 08 Jul 2019 08:08 )  Alb: 2.2 [3.5 - 5.2] / Pro: 4.9 [6.0 - 8.0] / ALK PHOS: 117 [30 - 115] / ALT: 49 [0 - 41] / AST: 43 [0 - 41] / GGT: x     LIVER FUNCTIONS - ( 07 Jul 2019 17:53 )  Alb: 2.5 [3.5 - 5.2] / Pro: 5.5 [6.0 - 8.0] / ALK PHOS: 140 [30 - 115] / ALT: 58 [0 - 41] / AST: 44 [0 - 41] / GGT: x     LIVER FUNCTIONS - ( 06 Jul 2019 06:45 )  Alb: 2.6 [3.5 - 5.2] / Pro: 5.5 [6.0 - 8.0] / ALK PHOS: 144 [30 - 115] / ALT: 63 [0 - 41] / AST: 61 [0 - 41] / GGT: x     LIVER FUNCTIONS - ( 05 Jul 2019 11:21 )  Alb: 2.4 [3.5 - 5.2] / Pro: 4.9 [6.0 - 8.0] / ALK PHOS: 124 [30 - 115] / ALT: 57 [0 - 41] / AST: 54 [0 - 41] / GGT: x     LIVER FUNCTIONS - ( 04 Jul 2019 21:02 )  Alb: 2.8 [3.5 - 5.2] / Pro: 6.0 [6.0 - 8.0] / ALK PHOS: 143 [30 - 115] / ALT: 63 [0 - 41] / AST: 63 [0 - 41] / GGT: x         RADIOLOGY:  < from: CT Abdomen and Pelvis w/ Oral Cont (07.05.19 @ 02:09) >  IMPRESSION:   1. Ascending colon 9.8 cm long mass, suspicious for neoplasm.   2. Tumor abuts the second portion of duodenum.  3. Large rectal and colonic stool load, proximal and distal to mass. No   evidence of small bowel obstruction    < end of copied text > GI HPI Today:  Patient is a 89y old  Male who presents with a chief complaint of ascending colon mass  and anemia  patient has no melena, hematochezia , or hematemesis, no abdominal pain, no constipation.       PAST MEDICAL & SURGICAL HISTORY  DM (diabetes mellitus)  HTN (hypertension)  Glaucoma  CAD (coronary artery disease)  No significant past surgical history      ALLERGIES:  No Known Allergies      MEDICATIONS:  MEDICATIONS  (STANDING):  aspirin enteric coated 81 milliGRAM(s) Oral daily  atorvastatin 80 milliGRAM(s) Oral at bedtime  chlorhexidine 4% Liquid 1 Application(s) Topical <User Schedule>  dextrose 5%. 1000 milliLiter(s) (50 mL/Hr) IV Continuous <Continuous>  dextrose 50% Injectable 12.5 Gram(s) IV Push once  dextrose 50% Injectable 25 Gram(s) IV Push once  dextrose 50% Injectable 25 Gram(s) IV Push once  docusate sodium 100 milliGRAM(s) Oral three times a day  heparin  Injectable 5000 Unit(s) SubCutaneous every 8 hours  insulin glargine Injectable (LANTUS) 9 Unit(s) SubCutaneous at bedtime  insulin lispro (HumaLOG) corrective regimen sliding scale   SubCutaneous three times a day before meals  insulin lispro Injectable (HumaLOG) 3 Unit(s) SubCutaneous three times a day before meals  latanoprost 0.005% Ophthalmic Solution 1 Drop(s) Both EYES at bedtime  metoprolol succinate ER 50 milliGRAM(s) Oral daily  polyethylene glycol 3350 17 Gram(s) Oral two times a day  sodium chloride 0.9%. 500 milliLiter(s) (30 mL/Hr) IV Continuous <Continuous>    MEDICATIONS  (PRN):  dextrose 40% Gel 15 Gram(s) Oral once PRN Blood Glucose LESS THAN 70 milliGRAM(s)/deciliter  glucagon  Injectable 1 milliGRAM(s) IntraMuscular once PRN Glucose LESS THAN 70 milligrams/deciliter      REVIEW OF SYSTEMS  General:  No fevers  Eyes:  No reported pain   ENT:  No sore throat   NECK: No stiffness   CV:  No chest pain   Resp:  No shortness of breath  GI:  See HPI  :  No dysuria  Muscle:  No weakness  Neuro:  No tingling  Endocrine:  No polyuria  Heme:  No ecchymosis        VITALS:   T(F): 98.1 (07-08 @ 05:53), Max: 98.1 (07-08 @ 05:53)  HR: 93 (07-08 @ 06:01) (66 - 98)  BP: 123/56 (07-08 @ 06:01) (95/51 - 151/84)  BP(mean): --  RR: 18 (07-08 @ 05:53) (17 - 20)  SpO2: 100% (07-08 @ 06:01) (98% - 100%)        PHYSICAL EXAM:    GEN: No acute distress  LUNGS: Clear to auscultation bilaterally   HEART: S1/S2 present. RRR.   ABD/ GI: Soft, non-tender, non-distended. Bowel sounds present  EXT: NC/NC/NE/2+PP/ROSS  NEURO: AAOX3     Blood Work :                        8.7    13.32 )-----------( 313      ( 08 Jul 2019 08:08 )             26.9     PT/INR - ( 04 Jul 2019 21:02 )  INR: 1.00            07-08    135  |  98  |  72<HH>  ----------------------------<  102<H>  5.3<H>   |  26  |  3.4<H>    Ca    8.1<L>      08 Jul 2019 08:08  Mg     2.2     07-08      CBC -  ( 08 Jul 2019 08:08 )  Hemoglobin : 8.7    CBC -  ( 07 Jul 2019 17:53 )  Hemoglobin : 10.2    CBC -  ( 06 Jul 2019 06:45 )  Hemoglobin : 9.9    CBC -  ( 05 Jul 2019 11:21 )  Hemoglobin : 9.0    CBC -  ( 04 Jul 2019 21:02 )  Hemoglobin : 9.9      LIVER FUNCTIONS - ( 08 Jul 2019 08:08 )  Alb: 2.2 [3.5 - 5.2] / Pro: 4.9 [6.0 - 8.0] / ALK PHOS: 117 [30 - 115] / ALT: 49 [0 - 41] / AST: 43 [0 - 41] / GGT: x     LIVER FUNCTIONS - ( 07 Jul 2019 17:53 )  Alb: 2.5 [3.5 - 5.2] / Pro: 5.5 [6.0 - 8.0] / ALK PHOS: 140 [30 - 115] / ALT: 58 [0 - 41] / AST: 44 [0 - 41] / GGT: x     LIVER FUNCTIONS - ( 06 Jul 2019 06:45 )  Alb: 2.6 [3.5 - 5.2] / Pro: 5.5 [6.0 - 8.0] / ALK PHOS: 144 [30 - 115] / ALT: 63 [0 - 41] / AST: 61 [0 - 41] / GGT: x     LIVER FUNCTIONS - ( 05 Jul 2019 11:21 )  Alb: 2.4 [3.5 - 5.2] / Pro: 4.9 [6.0 - 8.0] / ALK PHOS: 124 [30 - 115] / ALT: 57 [0 - 41] / AST: 54 [0 - 41] / GGT: x     LIVER FUNCTIONS - ( 04 Jul 2019 21:02 )  Alb: 2.8 [3.5 - 5.2] / Pro: 6.0 [6.0 - 8.0] / ALK PHOS: 143 [30 - 115] / ALT: 63 [0 - 41] / AST: 63 [0 - 41] / GGT: x         RADIOLOGY:  < from: CT Abdomen and Pelvis w/ Oral Cont (07.05.19 @ 02:09) >  IMPRESSION:   1. Ascending colon 9.8 cm long mass, suspicious for neoplasm.   2. Tumor abuts the second portion of duodenum.  3. Large rectal and colonic stool load, proximal and distal to mass. No   evidence of small bowel obstruction    < end of copied text >

## 2019-07-08 NOTE — PROGRESS NOTE ADULT - SUBJECTIVE AND OBJECTIVE BOX
SUBJECTIVE:    Patient is a 89y old Male who presents with a chief complaint of constipation , weight loss (08 Jul 2019 13:17)    Currently admitted to medicine with the primary diagnosis of EDNA (acute kidney injury)     Today is hospital day 3d.     PAST MEDICAL & SURGICAL HISTORY  DM (diabetes mellitus)  HTN (hypertension)  Glaucoma  CAD (coronary artery disease)  No significant past surgical history    ALLERGIES:  No Known Allergies    MEDICATIONS:  STANDING MEDICATIONS  aspirin enteric coated 81 milliGRAM(s) Oral daily  atorvastatin 80 milliGRAM(s) Oral at bedtime  chlorhexidine 4% Liquid 1 Application(s) Topical <User Schedule>  dextrose 5%. 1000 milliLiter(s) IV Continuous <Continuous>  dextrose 50% Injectable 12.5 Gram(s) IV Push once  dextrose 50% Injectable 25 Gram(s) IV Push once  dextrose 50% Injectable 25 Gram(s) IV Push once  docusate sodium 100 milliGRAM(s) Oral three times a day  heparin  Injectable 5000 Unit(s) SubCutaneous every 8 hours  insulin glargine Injectable (LANTUS) 9 Unit(s) SubCutaneous at bedtime  insulin lispro (HumaLOG) corrective regimen sliding scale   SubCutaneous three times a day before meals  insulin lispro Injectable (HumaLOG) 3 Unit(s) SubCutaneous three times a day before meals  latanoprost 0.005% Ophthalmic Solution 1 Drop(s) Both EYES at bedtime  metoprolol succinate ER 50 milliGRAM(s) Oral daily  polyethylene glycol 3350 17 Gram(s) Oral two times a day  sodium chloride 0.9%. 500 milliLiter(s) IV Continuous <Continuous>    PRN MEDICATIONS  dextrose 40% Gel 15 Gram(s) Oral once PRN  glucagon  Injectable 1 milliGRAM(s) IntraMuscular once PRN    VITALS:   T(F): 97  HR: 84  BP: 93/50  RR: 20  SpO2: 100%    LABS:                        8.7    13.32 )-----------( 313      ( 08 Jul 2019 08:08 )             26.9     07-08    135  |  98  |  72<HH>  ----------------------------<  102<H>  5.3<H>   |  26  |  3.4<H>    Ca    8.1<L>      08 Jul 2019 08:08  Mg     2.2     07-08    TPro  4.9<L>  /  Alb  2.2<L>  /  TBili  0.3  /  DBili  x   /  AST  43<H>  /  ALT  49<H>  /  AlkPhos  117<H>  07-08          Troponin T, Serum: 0.13 ng/mL <HH> (07-08-19 @ 00:13)  Troponin T, Serum: 0.13 ng/mL <HH> (07-07-19 @ 17:53)      CARDIAC MARKERS ( 08 Jul 2019 00:13 )  x     / 0.13 ng/mL / x     / x     / x      CARDIAC MARKERS ( 07 Jul 2019 17:53 )  x     / 0.13 ng/mL / x     / x     / x          RADIOLOGY:    PHYSICAL EXAM:  GEN: No acute distress  LUNGS: Clear to auscultation bilaterally   HEART: S1/S2 present. RRR.   ABD/ GI: Soft, non-tender, non-distended. Bowel sounds present  EXT: NC/NC/NE/2+PP/ROSS  NEURO: AAOX3

## 2019-07-08 NOTE — PROGRESS NOTE ADULT - ASSESSMENT
89 years old male with reported history of CAD but refused stent, DM, and chronic anemia, presented to the ED with progressive weakness.    Weakness likely due to dementia progression, suspected GI malignancy  -case d/w pt's daughter-she and family would like to focus on comfort measures given overall poor performance status  -hospice consult  -case d/w GI earliest colonoscopy will be done is 7/9-will obtain hospice consult prior to colonoscopy and if pt is hospice appropriate will cancel procedure    Stool impaction-- has been having dark stools-- semiformed  -abdomen soft  -c/w laxatives  -    Hyponatremia  --encourage PO intake    Troponemia  -stable  -cardiology consult appreciated  -c/w aspirin     CKD stage IV  - Cr 2.4, at baseline  - Non-oliguric  - Avoid nephrotoxic drug    Alzheimer's Dementia  -as per daughter pt is at his baseline  -waxes and wanes    Normocytic anemia  -stable    HTN  - Continue metoprolol , hold Lisinopril for now due to hyperkalemia    CAD  - stable, BNP 4800  - Start Aspirin 81mg (through not in patient's home med)  - Continue Lipitor 80mg Q24hrs and Lasix 20mg Q24hrs    Hyperkalemia  -improved  -follow BMP    T2DM  -uncontrolled  -increase insulin    Leukocytosis  - No signs of systemic infection  - Could be reactive vs malignancy?  - Continue to monitor 89 years old male with reported history of CAD but refused stent, DM, and chronic anemia, presented to the ED with progressive weakness.    Weakness likely due to dementia progression, suspected GI malignancy  -case d/w pt's daughter-she and family would like to focus on comfort measures given overall poor performance status  -hospice consult-- plan is awaited  -case d/w GI earliest colonoscopy will be done is 7/9-will obtain hospice consult prior to colonoscopy and if pt is hospice appropriate will cancel procedure    Stool impaction-- has been having dark stools-- semiformed  -abdomen soft  -c/w laxatives  -    Hyponatremia resolved  --encourage PO intake    Troponemia  -stable  -cardiology consult-- underlying CAD  -c/w aspirin     CKD stage IV  - Cr 3.4--oliguric-- US of kidney showed bladder capacity of 610 cc-- subramanian placed by urology for  BPH  - Avoid nephrotoxic drug    Alzheimer's Dementia  -as per daughter pt is at his baseline  -waxes and wanes    Normocytic anemia  -stable    HTN  - Continue metoprolol , hold Lisinopril for now due to hyperkalemia    CAD  - stable, BNP 4800  - Start Aspirin 81mg (through not in patient's home med)  - Continue Lipitor 80mg Q24hrs and Lasix 20mg Q24hrs    Hyperkalemia  -still present due to EDNA    T2DM  -uncontrolled  -increase insulin    Leukocytosis  - No signs of systemic infection  - Could be reactive vs malignancy?  - Continue to monitor    Hospice plan awaited-- no active interventions today-- except subramanian-- will discuss further with family

## 2019-07-08 NOTE — PROGRESS NOTE ADULT - ASSESSMENT
STEWART SANTORO 89y Male  MRN#: [id number]   CODE STATUS: FULL      SUBJECTIVE  Patient is a 89y old Male who presents with a chief complaint of constipation , weight loss (08 Jul 2019 13:17)    Currently admitted to medicine with the primary diagnosis of possible GI malignancy.     Today is hospital day 3d, and this morning he is laying in bed comfortably and reports no overnight events.  He denies chest pain, shortness of breath, nausea, vomiting or changes in bowel habits.  He has no complaints today.     Present Today:           Subramanian Catheter (x)No/ ()Yes?     Indication:             Central Line (x)No/ ()Yes?   Indication:          IV Fluids (x)No/ ()Yes? Type:  Rate:  Indication:    OBJECTIVE  PAST MEDICAL & SURGICAL HISTORY  DM (diabetes mellitus)  HTN (hypertension)  Glaucoma  CAD (coronary artery disease)  No significant past surgical history    ALLERGIES:  No Known Allergies    HOME MEDICATIONS:  Home Medications:  Accupril 5 mg oral tablet: 1 tab(s) orally once a day (05 Jul 2019 05:52)  furosemide 20 mg oral tablet: 1 tab(s) orally once a day (05 Jul 2019 05:52)  Iron 100 Plus oral tablet: 1 tab(s) orally once a day (05 Jul 2019 05:52)  latanoprost 0.005% ophthalmic solution: 1 drop(s) to each affected eye once a day (in the evening) (05 Jul 2019 05:52)  Lipitor 80 mg oral tablet: 1 tab(s) orally once a day (05 Jul 2019 05:52)  metoprolol succinate 50 mg oral tablet, extended release: 1 tab(s) orally once a day (05 Jul 2019 05:52)    MEDICATIONS:  STANDING MEDICATIONS  aspirin enteric coated 81 milliGRAM(s) Oral daily  atorvastatin 80 milliGRAM(s) Oral at bedtime  chlorhexidine 4% Liquid 1 Application(s) Topical <User Schedule>  dextrose 5%. 1000 milliLiter(s) (50 mL/Hr) IV Continuous <Continuous>  dextrose 50% Injectable 12.5 Gram(s) IV Push once  dextrose 50% Injectable 25 Gram(s) IV Push once  dextrose 50% Injectable 25 Gram(s) IV Push once  docusate sodium 100 milliGRAM(s) Oral three times a day  heparin  Injectable 5000 Unit(s) SubCutaneous every 8 hours  insulin glargine Injectable (LANTUS) 9 Unit(s) SubCutaneous at bedtime  insulin lispro (HumaLOG) corrective regimen sliding scale   SubCutaneous three times a day before meals  insulin lispro Injectable (HumaLOG) 3 Unit(s) SubCutaneous three times a day before meals  latanoprost 0.005% Ophthalmic Solution 1 Drop(s) Both EYES at bedtime  metoprolol succinate ER 50 milliGRAM(s) Oral daily  polyethylene glycol 3350 17 Gram(s) Oral two times a day  sodium chloride 0.9%. 500 milliLiter(s) (30 mL/Hr) IV Continuous <Continuous>  tamsulosin 0.4 milliGRAM(s) Oral at bedtime    PRN MEDICATIONS  dextrose 40% Gel 15 Gram(s) Oral once PRN  glucagon  Injectable 1 milliGRAM(s) IntraMuscular once PRN      VITAL SIGNS: Last 24 Hours  T(C): 36.1 (08 Jul 2019 13:54), Max: 36.7 (08 Jul 2019 05:53)  T(F): 97 (08 Jul 2019 13:54), Max: 98.1 (08 Jul 2019 05:53)  HR: 84 (08 Jul 2019 13:54) (84 - 94)  BP: 93/50 (08 Jul 2019 13:54) (93/50 - 123/56)  BP(mean): --  RR: 20 (08 Jul 2019 13:54) (18 - 20)  SpO2: 100% (08 Jul 2019 06:01) (100% - 100%)    LABS:                        8.7    13.32 )-----------( 313      ( 08 Jul 2019 08:08 )             26.9     07-08    135  |  98  |  72<HH>  ----------------------------<  102<H>  5.3<H>   |  26  |  3.4<H>    Ca    8.1<L>      08 Jul 2019 08:08  Mg     2.2     07-08    TPro  4.9<L>  /  Alb  2.2<L>  /  TBili  0.3  /  DBili  x   /  AST  43<H>  /  ALT  49<H>  /  AlkPhos  117<H>  07-08          Troponin T, Serum: 0.13 ng/mL <HH> (07-08-19 @ 00:13)  Troponin T, Serum: 0.13 ng/mL <HH> (07-07-19 @ 17:53)    CARDIAC MARKERS ( 08 Jul 2019 00:13 )  x     / 0.13 ng/mL / x     / x     / x      CARDIAC MARKERS ( 07 Jul 2019 17:53 )  x     / 0.13 ng/mL / x     / x     / x            RADIOLOGY:    EXAM:  XR CHEST PORTABLE ROUTINE 1V          PROCEDURE DATE:  07/08/2019     IMPRESSION:     Low lung volumes. No radiographic evidence of acute cardiopulmonary   disease.    ECHO:    Transthoracic Echocardiogram:    EXAM:  2-D ECHO (TTE) COMPLETE        PROCEDURE DATE:  07/05/2019        Summary:   1. Technically suboptimal study.   2. Normal global left ventricular systolic function.   3. LV Ejection Fraction by Lee's Method with a biplane EF of 53 %.   4. Mild left ventricular hypertrophy.   5. Mildly increased LV wall thickness.   6. Normal left ventricular internal cavity size.   7. Spectral Doppler shows impaired relaxation pattern of left   ventricular myocardial filling (Grade I diastolic dysfunction).   8. Mild mitral valve regurgitation.   9. Moderate thickening and calcification of the anterior and posterior   mitral valve leaflets.  10. MV leaflets thickened and calcified with MV opening not well seen .   Severe MAC. No significant MS by MV area obtained by doppler but up to   mild to moderate MS by MV gradient on doppler.  11. Fibrocalcific AV disease with reduced AV opening Severe AS by AV area   obtained but AS only mild by AV gradient and AV opening only seems   moderately reduced.  12. Mildly dilated aortic root.  13. Peak transaortic gradient equals 27.1 mmHg, mean transaortic gradient   equals 13.4 mmHg, the calculated aortic valve area equals 0.82 cm² by the   continuity equation consistent withsevere aortic stenosis.  14. The aortic valve mean gradient is 13.4 mmHg consistent with mild   aortic stenosis.    PHYSICAL EXAM:    GENERAL: NAD, well-developed, AAOx3  HEENT:  Atraumatic, Normocephalic. EOMI, PERRLA, conjunctiva and sclera clear, No JVD  PULMONARY: Clear to auscultation bilaterally; No wheeze  CARDIOVASCULAR: Regular rate and rhythm; No murmurs, rubs, or gallops  GASTROINTESTINAL: Soft, Nontender, Nondistended; Bowel sounds present  MUSCULOSKELETAL:  2+ Peripheral Pulses, No clubbing, cyanosis, or edema  NEUROLOGY: non-focal  SKIN: No rashes or lesions    ASSESSMENT & PLAN    89 years old male with reported history of CAD but refused stent, DM, and chronic anemia, presented to the ED with progressive weakness.    #) Weakness likely due to dementia progression, suspected GI malignancy  -case d/w pt's daughter-she and family would like to focus on comfort measures given overall poor performance status  -hospice consult placed, will follow   - GI earliest colonoscopy will be done is 7/9-pending hospice and family decision to do procedure    #) Stool impaction; resolved   -having soft, dark stool now    #) Hyponatremia; resolved    #) Troponemia  -stable  -cardiology consult appreciated  -c/w aspirin    #) EDNA on CKD stage IV  -Obstructive-Prostate is enlarged measuring 8.0 x 5.8 x8.9 cm, volume 216 cc  -Urology consult placed for subramanian and guidance   -Non-oliguric  -Avoid nephrotoxic drug    #) Alzheimer's Dementia  -as per daughter pt is at his baseline  -waxes and wanes    #) Normocytic anemia  -stable    #) HTN  - Continue metoprolol , holding Lisinopril for now due to hyperkalemia    #) CAD  - stable, BNP 4800  - Start Aspirin 81mg (through not in patient's home med)  - Continue Lipitor 80mg Q24hrs and Lasix 20mg Q24hrs    #) Hyperkalemia  -follow BMP, low K diet     #) DMII   -stable   -A1c 5.9     #) Leukocytosis  - No signs of systemic infection  - Could be reactive vs malignancy?  - Continue to monitor      GI ppx-Not indicated       DVT ppx-heparin sc     Dispo: Acute

## 2019-07-08 NOTE — PROGRESS NOTE ADULT - ASSESSMENT
89 years old male with reported history of Mild dementia, CAD, DM, HTN and chronic anemia, presented to the ED with progressive weakness. As per the daughter, patient has been having  constipation for the last few months alternating with large bowel movement once in a week which the family attributed to iron pills. Worsening anemia for which PMD started the patient on Ferrous sulphate TID. c/o subjective weight loss, decreased appetite in last 3 months.  No complaints of abdominal pain, nausea, rectal pain. No complaints of laexi bleeding in stool or change in color of stool.  Patient had colonoscopies in the past as part of routine screening and last one was more than 10yrs ago. Polyps removed as per the daughter but unsure about the results and doesn't remember where it was done. Father had colon cancer at the age of 83.  Incidental finding of 9.8cm colonic mass seen in ascending colon on CT abdomen with CBC showing iron deficiency anemia.     #Ascending colon mass  -CT abdomen with oral contrast showed incidental 9.8cm long mass in ascending colon abuting second part of duodenum. Large rectal and colonic stool load seen proximal and distal to the mass.  tumor markers Ca 19-9 ( 25) and CEA (8.9)    -needs colonoscopy with biopsy to determine the nature of the mass , family leaning toward hospice care, hospice consult is in , please call back if goals of care changed and  family want colonoscopy     #constipation:  Patient needs disimpaction before colonoscopy. Soap water enemas every 4h prn, Miralax senna, colace until bowel is cleared off stool.   please call back for any questions. 89 years old male with reported history of Mild dementia, CAD, DM, HTN and chronic anemia, presented to the ED with progressive weakness. As per the daughter, patient has been having  constipation for the last few months alternating with large bowel movement once in a week which the family attributed to iron pills. Worsening anemia for which PMD started the patient on Ferrous sulphate TID. c/o subjective weight loss, decreased appetite in last 3 months.  No complaints of abdominal pain, nausea, rectal pain. No complaints of alexi bleeding in stool or change in color of stool.  Patient had colonoscopies in the past as part of routine screening and last one was more than 10yrs ago. Polyps removed as per the daughter but unsure about the results and doesn't remember where it was done. Father had colon cancer at the age of 83.  Incidental finding of 9.8cm colonic mass seen in ascending colon on CT abdomen with CBC showing iron deficiency anemia.     #Ascending colon mass  -CT abdomen with oral contrast showed incidental 9.8cm long mass in ascending colon abuting second part of duodenum. Large rectal and colonic stool load seen proximal and distal to the mass.  tumor markers Ca 19-9 ( 25) and CEA (8.9)    -needs colonoscopy with biopsy to determine the nature of the mass , family leaning toward hospice care, hospice consult is in , please call back   family wants colonoscopy     #constipation:  Patient would need disimpaction before colonoscopy

## 2019-07-09 LAB
ANION GAP SERPL CALC-SCNC: 11 MMOL/L — SIGNIFICANT CHANGE UP (ref 7–14)
APPEARANCE UR: CLEAR — SIGNIFICANT CHANGE UP
APTT BLD: 43.8 SEC — HIGH (ref 27–39.2)
BACTERIA # UR AUTO: NEGATIVE — SIGNIFICANT CHANGE UP
BASOPHILS # BLD AUTO: 0.01 K/UL — SIGNIFICANT CHANGE UP (ref 0–0.2)
BASOPHILS NFR BLD AUTO: 0.1 % — SIGNIFICANT CHANGE UP (ref 0–1)
BILIRUB UR-MCNC: NEGATIVE — SIGNIFICANT CHANGE UP
BLD GP AB SCN SERPL QL: SIGNIFICANT CHANGE UP
BUN SERPL-MCNC: 78 MG/DL — CRITICAL HIGH (ref 10–20)
CALCIUM SERPL-MCNC: 7.9 MG/DL — LOW (ref 8.5–10.1)
CHLORIDE SERPL-SCNC: 102 MMOL/L — SIGNIFICANT CHANGE UP (ref 98–110)
CO2 SERPL-SCNC: 24 MMOL/L — SIGNIFICANT CHANGE UP (ref 17–32)
COLOR SPEC: YELLOW — SIGNIFICANT CHANGE UP
COMMENT - URINE: SIGNIFICANT CHANGE UP
CREAT SERPL-MCNC: 3.6 MG/DL — HIGH (ref 0.7–1.5)
DIFF PNL FLD: ABNORMAL
EOSINOPHIL # BLD AUTO: 0.14 K/UL — SIGNIFICANT CHANGE UP (ref 0–0.7)
EOSINOPHIL NFR BLD AUTO: 1 % — SIGNIFICANT CHANGE UP (ref 0–8)
EPI CELLS # UR: 3 /HPF — SIGNIFICANT CHANGE UP (ref 0–5)
GLUCOSE BLDC GLUCOMTR-MCNC: 118 MG/DL — HIGH (ref 70–99)
GLUCOSE BLDC GLUCOMTR-MCNC: 137 MG/DL — HIGH (ref 70–99)
GLUCOSE BLDC GLUCOMTR-MCNC: 145 MG/DL — HIGH (ref 70–99)
GLUCOSE BLDC GLUCOMTR-MCNC: 151 MG/DL — HIGH (ref 70–99)
GLUCOSE BLDC GLUCOMTR-MCNC: 198 MG/DL — HIGH (ref 70–99)
GLUCOSE SERPL-MCNC: 119 MG/DL — HIGH (ref 70–99)
GLUCOSE UR QL: NEGATIVE — SIGNIFICANT CHANGE UP
HCT VFR BLD CALC: 27.2 % — LOW (ref 42–52)
HGB BLD-MCNC: 8.7 G/DL — LOW (ref 14–18)
HYALINE CASTS # UR AUTO: 6 /LPF — SIGNIFICANT CHANGE UP (ref 0–7)
IMM GRANULOCYTES NFR BLD AUTO: 0.8 % — HIGH (ref 0.1–0.3)
INR BLD: 1.1 RATIO — SIGNIFICANT CHANGE UP (ref 0.65–1.3)
KETONES UR-MCNC: NEGATIVE — SIGNIFICANT CHANGE UP
LEUKOCYTE ESTERASE UR-ACNC: ABNORMAL
LYMPHOCYTES # BLD AUTO: 1.44 K/UL — SIGNIFICANT CHANGE UP (ref 1.2–3.4)
LYMPHOCYTES # BLD AUTO: 10.1 % — LOW (ref 20.5–51.1)
MAGNESIUM SERPL-MCNC: 2.2 MG/DL — SIGNIFICANT CHANGE UP (ref 1.8–2.4)
MCHC RBC-ENTMCNC: 26.7 PG — LOW (ref 27–31)
MCHC RBC-ENTMCNC: 32 G/DL — SIGNIFICANT CHANGE UP (ref 32–37)
MCV RBC AUTO: 83.4 FL — SIGNIFICANT CHANGE UP (ref 80–94)
MONOCYTES # BLD AUTO: 0.82 K/UL — HIGH (ref 0.1–0.6)
MONOCYTES NFR BLD AUTO: 5.8 % — SIGNIFICANT CHANGE UP (ref 1.7–9.3)
NEUTROPHILS # BLD AUTO: 11.74 K/UL — HIGH (ref 1.4–6.5)
NEUTROPHILS NFR BLD AUTO: 82.2 % — HIGH (ref 42.2–75.2)
NITRITE UR-MCNC: NEGATIVE — SIGNIFICANT CHANGE UP
NRBC # BLD: 0 /100 WBCS — SIGNIFICANT CHANGE UP (ref 0–0)
PH UR: 6 — SIGNIFICANT CHANGE UP (ref 5–8)
PHOSPHATE SERPL-MCNC: 3.5 MG/DL — SIGNIFICANT CHANGE UP (ref 2.1–4.9)
PLATELET # BLD AUTO: 271 K/UL — SIGNIFICANT CHANGE UP (ref 130–400)
POTASSIUM SERPL-MCNC: 5 MMOL/L — SIGNIFICANT CHANGE UP (ref 3.5–5)
POTASSIUM SERPL-SCNC: 5 MMOL/L — SIGNIFICANT CHANGE UP (ref 3.5–5)
PROT UR-MCNC: ABNORMAL
PROTHROM AB SERPL-ACNC: 12.6 SEC — SIGNIFICANT CHANGE UP (ref 9.95–12.87)
RBC # BLD: 3.26 M/UL — LOW (ref 4.7–6.1)
RBC # FLD: 17.5 % — HIGH (ref 11.5–14.5)
RBC CASTS # UR COMP ASSIST: 10 /HPF — HIGH (ref 0–4)
SODIUM SERPL-SCNC: 137 MMOL/L — SIGNIFICANT CHANGE UP (ref 135–146)
SODIUM UR-SCNC: 22 MMOL/L — SIGNIFICANT CHANGE UP
SP GR SPEC: 1.02 — SIGNIFICANT CHANGE UP (ref 1.01–1.02)
TROPONIN T SERPL-MCNC: 0.11 NG/ML — CRITICAL HIGH
UROBILINOGEN FLD QL: SIGNIFICANT CHANGE UP
WBC # BLD: 14.26 K/UL — HIGH (ref 4.8–10.8)
WBC # FLD AUTO: 14.26 K/UL — HIGH (ref 4.8–10.8)
WBC UR QL: 10 /HPF — HIGH (ref 0–5)

## 2019-07-09 PROCEDURE — 99233 SBSQ HOSP IP/OBS HIGH 50: CPT

## 2019-07-09 RX ADMIN — ATORVASTATIN CALCIUM 80 MILLIGRAM(S): 80 TABLET, FILM COATED ORAL at 22:07

## 2019-07-09 RX ADMIN — HEPARIN SODIUM 5000 UNIT(S): 5000 INJECTION INTRAVENOUS; SUBCUTANEOUS at 13:40

## 2019-07-09 RX ADMIN — Medication 3 UNIT(S): at 12:05

## 2019-07-09 RX ADMIN — LATANOPROST 1 DROP(S): 0.05 SOLUTION/ DROPS OPHTHALMIC; TOPICAL at 22:08

## 2019-07-09 RX ADMIN — HEPARIN SODIUM 5000 UNIT(S): 5000 INJECTION INTRAVENOUS; SUBCUTANEOUS at 22:07

## 2019-07-09 RX ADMIN — CHLORHEXIDINE GLUCONATE 1 APPLICATION(S): 213 SOLUTION TOPICAL at 05:55

## 2019-07-09 RX ADMIN — TAMSULOSIN HYDROCHLORIDE 0.4 MILLIGRAM(S): 0.4 CAPSULE ORAL at 22:07

## 2019-07-09 RX ADMIN — Medication 50 MILLIGRAM(S): at 05:52

## 2019-07-09 RX ADMIN — Medication 1: at 17:16

## 2019-07-09 RX ADMIN — INSULIN GLARGINE 9 UNIT(S): 100 INJECTION, SOLUTION SUBCUTANEOUS at 22:07

## 2019-07-09 RX ADMIN — SODIUM CHLORIDE 50 MILLILITER(S): 9 INJECTION INTRAMUSCULAR; INTRAVENOUS; SUBCUTANEOUS at 13:05

## 2019-07-09 RX ADMIN — Medication 81 MILLIGRAM(S): at 12:04

## 2019-07-09 RX ADMIN — Medication 100 MILLIGRAM(S): at 22:07

## 2019-07-09 RX ADMIN — Medication 100 MILLIGRAM(S): at 13:40

## 2019-07-09 RX ADMIN — Medication 3 UNIT(S): at 08:24

## 2019-07-09 RX ADMIN — Medication 100 MILLIGRAM(S): at 05:52

## 2019-07-09 RX ADMIN — HEPARIN SODIUM 5000 UNIT(S): 5000 INJECTION INTRAVENOUS; SUBCUTANEOUS at 05:53

## 2019-07-09 RX ADMIN — Medication 3 UNIT(S): at 17:16

## 2019-07-09 NOTE — PROGRESS NOTE ADULT - ASSESSMENT
STEWART SANTORO 89y Male  MRN#: [id number]   CODE STATUS: FULL      SUBJECTIVE  Patient is a 89y old Male who presents with a chief complaint of confusion (08 Jul 2019 16:33)    Currently admitted to medicine with the primary diagnosis of Weakness likely secondary to GI malignancy.     Today is hospital day 4d, and this morning he is laying in bed comfortably and reports no overnight events.   He has no complaints today.       Present Today:           Subramanian Catheter (x)No/ ()Yes? Indwelling Urethral Catheter    Indication:   BPH          Central Line (x)No/ ()Yes?   Indication:          IV Fluids ()No/ (x)Yes? Type:  NS  Rate: 50  Indication: EDNA     OBJECTIVE  PAST MEDICAL & SURGICAL HISTORY  DM (diabetes mellitus)  HTN (hypertension)  Glaucoma  CAD (coronary artery disease)  No significant past surgical history    ALLERGIES:  No Known Allergies    HOME MEDICATIONS:  Home Medications:  Accupril 5 mg oral tablet: 1 tab(s) orally once a day (05 Jul 2019 05:52)  furosemide 20 mg oral tablet: 1 tab(s) orally once a day (05 Jul 2019 05:52)  Iron 100 Plus oral tablet: 1 tab(s) orally once a day (05 Jul 2019 05:52)  latanoprost 0.005% ophthalmic solution: 1 drop(s) to each affected eye once a day (in the evening) (05 Jul 2019 05:52)  Lipitor 80 mg oral tablet: 1 tab(s) orally once a day (05 Jul 2019 05:52)  metoprolol succinate 50 mg oral tablet, extended release: 1 tab(s) orally once a day (05 Jul 2019 05:52)    MEDICATIONS:  STANDING MEDICATIONS  aspirin enteric coated 81 milliGRAM(s) Oral daily  atorvastatin 80 milliGRAM(s) Oral at bedtime  chlorhexidine 4% Liquid 1 Application(s) Topical <User Schedule>  dextrose 5%. 1000 milliLiter(s) (50 mL/Hr) IV Continuous <Continuous>  dextrose 50% Injectable 12.5 Gram(s) IV Push once  dextrose 50% Injectable 25 Gram(s) IV Push once  dextrose 50% Injectable 25 Gram(s) IV Push once  docusate sodium 100 milliGRAM(s) Oral three times a day  heparin  Injectable 5000 Unit(s) SubCutaneous every 8 hours  insulin glargine Injectable (LANTUS) 9 Unit(s) SubCutaneous at bedtime  insulin lispro (HumaLOG) corrective regimen sliding scale   SubCutaneous three times a day before meals  insulin lispro Injectable (HumaLOG) 3 Unit(s) SubCutaneous three times a day before meals  latanoprost 0.005% Ophthalmic Solution 1 Drop(s) Both EYES at bedtime  metoprolol succinate ER 50 milliGRAM(s) Oral daily  polyethylene glycol 3350 17 Gram(s) Oral two times a day  sodium chloride 0.9%. 500 milliLiter(s) (50 mL/Hr) IV Continuous <Continuous>  tamsulosin 0.4 milliGRAM(s) Oral at bedtime    PRN MEDICATIONS  dextrose 40% Gel 15 Gram(s) Oral once PRN  glucagon  Injectable 1 milliGRAM(s) IntraMuscular once PRN      VITAL SIGNS: Last 24 Hours  T(C): 35.9 (09 Jul 2019 05:37), Max: 36.1 (08 Jul 2019 13:54)  T(F): 96.6 (09 Jul 2019 05:37), Max: 97 (08 Jul 2019 13:54)  HR: 85 (09 Jul 2019 05:37) (79 - 85)  BP: 104/58 (09 Jul 2019 05:37) (93/50 - 109/63)  BP(mean): --  RR: 18 (09 Jul 2019 05:37) (18 - 20)  SpO2: --    LABS:                        8.7    14.26 )-----------( 271      ( 09 Jul 2019 05:48 )             27.2     07-09    137  |  102  |  78<HH>  ----------------------------<  119<H>  5.0   |  24  |  3.6<H>    Ca    7.9<L>      09 Jul 2019 05:48  Phos  3.5     07-09  Mg     2.2     07-09    TPro  4.9<L>  /  Alb  2.2<L>  /  TBili  0.3  /  DBili  x   /  AST  43<H>  /  ALT  49<H>  /  AlkPhos  117<H>  07-08    PT/INR - ( 09 Jul 2019 05:48 )   PT: 12.60 sec;   INR: 1.10 ratio         PTT - ( 09 Jul 2019 05:48 )  PTT:43.8 sec      Troponin T, Serum: 0.11 ng/mL <HH> (07-09-19 @ 05:48)    CARDIAC MARKERS ( 09 Jul 2019 05:48 )  x     / 0.11 ng/mL / x     / x     / x      CARDIAC MARKERS ( 08 Jul 2019 00:13 )  x     / 0.13 ng/mL / x     / x     / x      CARDIAC MARKERS ( 07 Jul 2019 17:53 )  x     / 0.13 ng/mL / x     / x     / x            RADIOLOGY:          ECHO:  Transthoracic Echocardiogram:    EXAM:  2-D ECHO (TTE) COMPLETE        PROCEDURE DATE:  07/05/2019      INTERPRETATION:  REPORT:    TRANSTHORACIC ECHOCARDIOGRAM REPORT    Summary:   1. Technically suboptimal study.   2. Normal global left ventricular systolic function.   3. LV Ejection Fraction by Lee's Method with a biplane EF of 53 %.   4. Mild left ventricular hypertrophy.   5. Mildly increased LV wall thickness.   6. Normal left ventricular internal cavity size.   7. Spectral Doppler shows impaired relaxation pattern of left   ventricular myocardial filling (Grade I diastolic dysfunction).   8. Mild mitral valve regurgitation.   9. Moderate thickening and calcification of the anterior and posterior   mitral valve leaflets.  10. MV leaflets thickened and calcified with MV opening not well seen .   Severe MAC. No significant MS by MV area obtained by doppler but up to   mild to moderate MS by MV gradient on doppler.  11. Fibrocalcific AV disease with reduced AV opening Severe AS by AV area   obtained but AS only mild by AV gradient and AV opening only seems   moderately reduced.  12. Mildly dilated aortic root.  13. Peak transaortic gradient equals 27.1 mmHg, mean transaortic gradient   equals 13.4 mmHg, the calculated aortic valve area equals 0.82 cm² by the   continuity equation consistent with severe aortic stenosis.  14. The aortic valve mean gradient is 13.4 mmHg consistent with mild   aortic stenosis.      PHYSICAL EXAM:    GENERAL: NAD, well-developed, AAOx2   HEENT:  Atraumatic, Normocephalic. EOMI, PERRLA, conjunctiva and sclera clear, No JVD  PULMONARY: Clear to auscultation bilaterally; No wheeze  CARDIOVASCULAR: Regular rate and rhythm; No murmurs, rubs, or gallops  GASTROINTESTINAL: Soft, Nontender, Nondistended; Bowel sounds present  MUSCULOSKELETAL:  2+ Peripheral Pulses, No clubbing, cyanosis, or edema  NEUROLOGY: non-focal  SKIN: No rashes or lesions    ASSESSMENT & PLAN    89 years old male with reported history of CAD but refused stent, DM, and chronic anemia, presented to the ED with progressive weakness.    #) Weakness likely due to dementia progression, suspected GI malignancy; stable   -case d/w pt's daughter-she and family would like to focus on comfort measures given overall poor performance status-hospice consult placed, will follow   - GI earliest colonoscopy--pending hospice and family decision to do procedure    #) EDNA on CKD stage IV; worsening   -Pre-renal mixed with post renal, BUN/Cr > 20, cont IVF, will send lytes   -Prostate is enlarged measuring 8.0 x 5.8 x8.9 cm, volume 216 cc  -Urology recs appreciated, awaiting attending, subramanian placed, Flomax    -Non-oliguric  -Avoid nephrotoxic agents    #) Stool impaction; resolved   -having soft, dark stool now    #) Hyponatremia; resolved    #) Troponemia  -stable  -cardiology consult appreciated  -c/w aspirin    #) Alzheimer's Dementia  -as per daughter pt is at his baseline  -waxes and wanes    #) Normocytic anemia  -stable    #) HTN  - Continue metoprolol , holding Lisinopril for now due to hyperkalemia    #) CAD  - stable, BNP 4800  - Start Aspirin 81mg (through not in patient's home med)  - Continue Lipitor 80mg Q24hrs and Lasix 20mg Q24hrs    #) Hyperkalemia; improving   -follow BMP, low K diet     #) DMII   -stable   -A1c 5.9     #) Leukocytosis  - No signs of systemic infection  - Could be reactive vs malignancy  - Continue to monitor    Diet, DASH/TLC:   Sodium & Cholesterol Restricted  Supplement Feeding Modality:  Oral  Glucerna Shake Cans or Servings Per Day:  1       Frequency:  Daily  Ensure Pudding Cans or Servings Per Day:  1       Frequency:  Daily (07-08-19 @ 15:31)    GI ppx- Not indicated     DVT ppx- heparin sc     Dispo: Acute

## 2019-07-09 NOTE — PROGRESS NOTE ADULT - ATTENDING COMMENTS
Patient seen and examined independently. Agree with resident note.  # COLON MASS-hb has remained stable and no further intervention is planned.  #EDNA worsened even after putting subramanian-- and is continued on IV fluids-- urine lytes and nephrology consult.  #leucocytosis is worsening --no sign of infection-- could be reactive?new subramanian, will check UA and urine culture      Hospice consult in AM-- family meeting tomorrow at 12pm with hospice team.
Pt seen and examined independently. Agree with above with following additions:    Pt irritable, refusing to answering ROS and participating in physical exam. As per RN staff pt had multiple, large BM. Pt pending colonoscopy. Follow up with GI.

## 2019-07-10 LAB
ANION GAP SERPL CALC-SCNC: 11 MMOL/L — SIGNIFICANT CHANGE UP (ref 7–14)
BASOPHILS # BLD AUTO: 0.02 K/UL — SIGNIFICANT CHANGE UP (ref 0–0.2)
BASOPHILS NFR BLD AUTO: 0.2 % — SIGNIFICANT CHANGE UP (ref 0–1)
BUN SERPL-MCNC: 76 MG/DL — CRITICAL HIGH (ref 10–20)
CALCIUM SERPL-MCNC: 7.4 MG/DL — LOW (ref 8.5–10.1)
CHLORIDE SERPL-SCNC: 104 MMOL/L — SIGNIFICANT CHANGE UP (ref 98–110)
CO2 SERPL-SCNC: 21 MMOL/L — SIGNIFICANT CHANGE UP (ref 17–32)
CREAT ?TM UR-MCNC: 89 MG/DL — SIGNIFICANT CHANGE UP
CREAT SERPL-MCNC: 3.3 MG/DL — HIGH (ref 0.7–1.5)
EOSINOPHIL # BLD AUTO: 0.14 K/UL — SIGNIFICANT CHANGE UP (ref 0–0.7)
EOSINOPHIL NFR BLD AUTO: 1.2 % — SIGNIFICANT CHANGE UP (ref 0–8)
GLUCOSE BLDC GLUCOMTR-MCNC: 144 MG/DL — HIGH (ref 70–99)
GLUCOSE BLDC GLUCOMTR-MCNC: 147 MG/DL — HIGH (ref 70–99)
GLUCOSE BLDC GLUCOMTR-MCNC: 155 MG/DL — HIGH (ref 70–99)
GLUCOSE BLDC GLUCOMTR-MCNC: 181 MG/DL — HIGH (ref 70–99)
GLUCOSE SERPL-MCNC: 122 MG/DL — HIGH (ref 70–99)
HCT VFR BLD CALC: 25.6 % — LOW (ref 42–52)
HGB BLD-MCNC: 8.2 G/DL — LOW (ref 14–18)
IMM GRANULOCYTES NFR BLD AUTO: 0.9 % — HIGH (ref 0.1–0.3)
LYMPHOCYTES # BLD AUTO: 1.42 K/UL — SIGNIFICANT CHANGE UP (ref 1.2–3.4)
LYMPHOCYTES # BLD AUTO: 11.7 % — LOW (ref 20.5–51.1)
MCHC RBC-ENTMCNC: 26.5 PG — LOW (ref 27–31)
MCHC RBC-ENTMCNC: 32 G/DL — SIGNIFICANT CHANGE UP (ref 32–37)
MCV RBC AUTO: 82.6 FL — SIGNIFICANT CHANGE UP (ref 80–94)
MONOCYTES # BLD AUTO: 0.8 K/UL — HIGH (ref 0.1–0.6)
MONOCYTES NFR BLD AUTO: 6.6 % — SIGNIFICANT CHANGE UP (ref 1.7–9.3)
NEUTROPHILS # BLD AUTO: 9.68 K/UL — HIGH (ref 1.4–6.5)
NEUTROPHILS NFR BLD AUTO: 79.4 % — HIGH (ref 42.2–75.2)
NRBC # BLD: 0 /100 WBCS — SIGNIFICANT CHANGE UP (ref 0–0)
PLATELET # BLD AUTO: 227 K/UL — SIGNIFICANT CHANGE UP (ref 130–400)
POTASSIUM SERPL-MCNC: 4.5 MMOL/L — SIGNIFICANT CHANGE UP (ref 3.5–5)
POTASSIUM SERPL-SCNC: 4.5 MMOL/L — SIGNIFICANT CHANGE UP (ref 3.5–5)
PROT ?TM UR-MCNC: 37 MG/DLG/24H — SIGNIFICANT CHANGE UP
PROT/CREAT UR-RTO: 0.4 RATIO — HIGH (ref 0–0.2)
RBC # BLD: 3.1 M/UL — LOW (ref 4.7–6.1)
RBC # FLD: 17.4 % — HIGH (ref 11.5–14.5)
SODIUM SERPL-SCNC: 136 MMOL/L — SIGNIFICANT CHANGE UP (ref 135–146)
UUN UR-MCNC: 635 MG/DL — SIGNIFICANT CHANGE UP
WBC # BLD: 12.17 K/UL — HIGH (ref 4.8–10.8)
WBC # FLD AUTO: 12.17 K/UL — HIGH (ref 4.8–10.8)

## 2019-07-10 PROCEDURE — 99232 SBSQ HOSP IP/OBS MODERATE 35: CPT

## 2019-07-10 RX ADMIN — Medication 1: at 17:23

## 2019-07-10 RX ADMIN — HEPARIN SODIUM 5000 UNIT(S): 5000 INJECTION INTRAVENOUS; SUBCUTANEOUS at 13:41

## 2019-07-10 RX ADMIN — Medication 1: at 12:05

## 2019-07-10 RX ADMIN — TAMSULOSIN HYDROCHLORIDE 0.4 MILLIGRAM(S): 0.4 CAPSULE ORAL at 22:30

## 2019-07-10 RX ADMIN — Medication 100 MILLIGRAM(S): at 13:41

## 2019-07-10 RX ADMIN — Medication 3 UNIT(S): at 08:12

## 2019-07-10 RX ADMIN — Medication 3 UNIT(S): at 17:23

## 2019-07-10 RX ADMIN — Medication 50 MILLIGRAM(S): at 06:53

## 2019-07-10 RX ADMIN — SODIUM CHLORIDE 50 MILLILITER(S): 9 INJECTION INTRAMUSCULAR; INTRAVENOUS; SUBCUTANEOUS at 08:11

## 2019-07-10 RX ADMIN — INSULIN GLARGINE 9 UNIT(S): 100 INJECTION, SOLUTION SUBCUTANEOUS at 22:30

## 2019-07-10 RX ADMIN — LATANOPROST 1 DROP(S): 0.05 SOLUTION/ DROPS OPHTHALMIC; TOPICAL at 22:30

## 2019-07-10 RX ADMIN — Medication 81 MILLIGRAM(S): at 12:05

## 2019-07-10 RX ADMIN — Medication 100 MILLIGRAM(S): at 06:53

## 2019-07-10 RX ADMIN — Medication 3 UNIT(S): at 12:04

## 2019-07-10 RX ADMIN — HEPARIN SODIUM 5000 UNIT(S): 5000 INJECTION INTRAVENOUS; SUBCUTANEOUS at 06:53

## 2019-07-10 RX ADMIN — POLYETHYLENE GLYCOL 3350 17 GRAM(S): 17 POWDER, FOR SOLUTION ORAL at 06:54

## 2019-07-10 RX ADMIN — Medication 100 MILLIGRAM(S): at 22:29

## 2019-07-10 RX ADMIN — ATORVASTATIN CALCIUM 80 MILLIGRAM(S): 80 TABLET, FILM COATED ORAL at 22:29

## 2019-07-10 RX ADMIN — POLYETHYLENE GLYCOL 3350 17 GRAM(S): 17 POWDER, FOR SOLUTION ORAL at 17:24

## 2019-07-10 RX ADMIN — CHLORHEXIDINE GLUCONATE 1 APPLICATION(S): 213 SOLUTION TOPICAL at 06:54

## 2019-07-10 RX ADMIN — HEPARIN SODIUM 5000 UNIT(S): 5000 INJECTION INTRAVENOUS; SUBCUTANEOUS at 22:30

## 2019-07-10 NOTE — PROGRESS NOTE ADULT - ASSESSMENT
# Colon mass - no w/u, Hb stable low, pt and daughter agreed for hospice. no further w/u/intreventions.    # EDNA on CKD 4 - pt is oligouric, cr slightly better, but still very high, cont gentle IVF for today, keep Levine in, pt will be discharged with Levine.   # leucocytosis, ? due to tumor, no signs of active infection, no ABx   # HTN - Continue metoprolol, Lisinopril on hold for now due to EDNA and hyperkalemia  # CAD - Started Aspirin 81mg, Lipitor 80mg Q24hrs and Lasix 20mg Q24hrs  # T2DM - on Insulin for now, will dc upon dc to hospice     DVT ppx  #Progress Note Handoff  Pending dc to [facility] tomorrow under hospice care.

## 2019-07-10 NOTE — CHART NOTE - NSCHARTNOTEFT_GEN_A_CORE
Registered Dietitian Follow-Up     Patient Profile Reviewed                           Yes [x]   No []     Nutrition History Previously Obtained        Yes []  No [x]       Pertinent Subjective Information: PO intake remains suboptimal, hospice following however, pt. is not officially comfort measures only as of RD writing this note.      Pertinent Medical Interventions: Weakness likely due to dementia progression, suspected GI malignancy; stable.   EDNA on CKD stage IV; worsening. Stool impaction; resolved. Alzheimer's Dementia- at baseline. DM2- stable. Case d/w pt's daughter-she and family would like to focus on comfort measures given overall poor performance status-hospice consult placed, family meeting today @12pm. .      Diet order: DASH/TLC, Glucerna daily, Ensure pudding daily      Anthropometrics:  - Ht. 170.1cm   - Wt. 63.1kg on 7/10 vs. 59.1kg on 7/7, ?bed scale discrepancy, weight gain unlikely, will continue to monitor wt trends  - %wt change  - BMI 20.4  - IBW 67.3kg      Pertinent Lab Data: (7/10) WBC 12.17, RBC 3.10, hg 8.2, Hct 25.6, BUN 76, creat 3.3, glu 122, eGFR 16      Pertinent Meds: Atorvastatin, Colace, Metoprolol, Miralax      Physical Findings:  - Appearance: confused/disoriented  - GI function: abd noted soft/nontender, last BM 6/10- black, tarry   - Tubes: none noted  - Oral/Mouth cavity: no symptoms noted  - Skin: intact (BS 17)      Nutrition Requirements (from RD note on 7/7)   Weight Used: 59.1kg      Estimated Energy Needs    Continue [x]  Adjust [] 7748-7869 kcal/day (MSJx1.2-1.3 AF)  Adjusted Energy Recommendations:   kcal/day        Estimated Protein Needs    Continue [x]  Adjust [] 59-71 g/day (1-1.2 g/kg ABW)  Adjusted Protein Recommendations:   gm/day        Estimated Fluid Needs        Continue [x]  Adjust [] 1mL/kcal or per LIP  Adjusted Fluid Recommendations:   mL/day     Nutrient Intake: 10-25% PO at meals         [] Previous Nutrition Diagnosis: Inadequate Oral Intake            [x] Ongoing          [] Resolved       Nutrition Intervention: meals and snacks, medical food supplement     Rec: Consult SLP for the most appropriate diet texture/consistency, discontinue DASH/TLC modifier as pt. with poor PO intake. Continue Glucerna and Ensure pudding daily.      Goal/Expected Outcome: In 3 days GOC to be established, if aggressive measures pursued pt. to consume at least 50-75% PO and supplement      Indicator/Monitoring: GOC, energy intake, body composition, NFPF

## 2019-07-10 NOTE — PROGRESS NOTE ADULT - SUBJECTIVE AND OBJECTIVE BOX
STEWART SANTORO    Patient is a 89y old  Male who presents with a chief complaint of Weakness (2019 10:49)    HPI:  89 years old male with reported history of CAD but refused stent, DM, and chronic anemia, presented to the ED with progressive weakness. patient is a poor historian and history was obtained from chart and family member. As per his daughter, patient has become more unsteady on ambulation and requires more assistance. Patient also has decreased appetite. Patient has fallen twice at home and they were both unwitnessed. Patient has no recollection of his fall and denies any LOC. Family also reports increased work of breathing, especially on exertion. Patient himself has no acute complaints and reports good appetite. In ED, trauma work up was completed due to previous falls and an incidental findings of 9.8cm colonic mass is seen in ascending colon.  In ED: 1L LR (2019 03:35)    INTERVAL HPI/OVERNIGHT EVENTS: no events overnight. Pt does not have active complaints.     ROS: All ROS negative except generalized weakness    PHYSICAL EXAM:  T(C): 35.9, Max: 36.1 (07-10- @ 05:58)  HR: 87 (85 - 104)  BP: 99/49 (93/55 - 110/61)  RR: 20 (18 - 20)  SpO2: 97% (96% - 97%)    GENERAL: NAD, pale, looks tired  PULMONARY/CHEST: No rales, rhonchi, wheezing  CARDIOVASC: Regular rate and rhythm; No murmurs  GI/ABDOMEN: Soft, Nontender, mildly distended; Bowel sounds present  EXTREMITIES:  2+ Peripheral Pulses, No clubbing, cyanosis, or edema, no deformity. No calf tenderness b/l.  NERVOUS SYSTEM:  Alert & Oriented X3, no focal deficit     Care Discussed with hospice nurse    LABS:                        8.2    12.17 )-----------( 227      ( 10 Jul 2019 05:57 )             25.6   Hemoglobin: 8.2 (07-10)  Hemoglobin: 8.7 ()  Hemoglobin: 8.7 ()    -10    136  |  104  |  76<HH>  ----------------------------<  122<H>  4.5   |  21  |  3.3<H>    Creatinine, Serum: 3.3 (07-10)  Creatinine, Serum: 3.6 ()  Creatinine, Serum: 3.4 ()    Ca    7.4<L>      10 Jul 2019 05:57  Phos  3.5       Mg     2.2     -      PT/INR - ( 2019 05:48 )   PT: 12.60 sec;   INR: 1.10 ratio         PTT - ( 2019 05:48 )  PTT:43.8 sec  Urinalysis Basic - ( 2019 18:10 )    Color: Yellow / Appearance: Clear / S.020 / pH: x  Gluc: x / Ketone: Negative  / Bili: Negative / Urobili: <2 mg/dL   Blood: x / Protein: 30 mg/dL / Nitrite: Negative   Leuk Esterase: Small / RBC: 10 /HPF / WBC 10 /HPF   Sq Epi: x / Non Sq Epi: 3 /HPF / Bacteria: Negative      CAPILLARY BLOOD GLUCOSE  POCT Blood Glucose.: 181 mg/dL (10 Jul 2019 16:51)  POCT Blood Glucose.: 155 mg/dL (10 Jul 2019 11:46)  POCT Blood Glucose.: 147 mg/dL (10 Jul 2019 07:58)  POCT Blood Glucose.: 145 mg/dL (2019 21:24)        MEDICATIONS  (STANDING):  aspirin enteric coated 81 milliGRAM(s) Oral daily  atorvastatin 80 milliGRAM(s) Oral at bedtime  chlorhexidine 4% Liquid 1 Application(s) Topical <User Schedule>  dextrose 5%. 1000 milliLiter(s) (50 mL/Hr) IV Continuous <Continuous>  dextrose 50% Injectable 12.5 Gram(s) IV Push once  dextrose 50% Injectable 25 Gram(s) IV Push once  dextrose 50% Injectable 25 Gram(s) IV Push once  docusate sodium 100 milliGRAM(s) Oral three times a day  heparin  Injectable 5000 Unit(s) SubCutaneous every 8 hours  insulin glargine Injectable (LANTUS) 9 Unit(s) SubCutaneous at bedtime  insulin lispro (HumaLOG) corrective regimen sliding scale   SubCutaneous three times a day before meals  insulin lispro Injectable (HumaLOG) 3 Unit(s) SubCutaneous three times a day before meals  latanoprost 0.005% Ophthalmic Solution 1 Drop(s) Both EYES at bedtime  metoprolol succinate ER 50 milliGRAM(s) Oral daily  polyethylene glycol 3350 17 Gram(s) Oral two times a day  sodium chloride 0.9%. 500 milliLiter(s) (50 mL/Hr) IV Continuous <Continuous>  tamsulosin 0.4 milliGRAM(s) Oral at bedtime    MEDICATIONS  (PRN):  dextrose 40% Gel 15 Gram(s) Oral once PRN Blood Glucose LESS THAN 70 milliGRAM(s)/deciliter  glucagon  Injectable 1 milliGRAM(s) IntraMuscular once PRN Glucose LESS THAN 70 milligrams/deciliter

## 2019-07-10 NOTE — PROGRESS NOTE ADULT - ASSESSMENT
STEWART SANTORO 89y Male  MRN#: [id number]   CODE STATUS: FULL      SUBJECTIVE  Patient is a 89y old Male who presents with a chief complaint of Weakness (2019 10:49)    Currently admitted to medicine with the primary diagnosis of Malignancy.     Today is hospital day 5d, and this morning he is laying in bed comfortably and reports no overnight events.     Present Today:           Subramanian Catheter ()No/ (x)Yes? Indwelling Urethral Catheter    Indication:             Central Line (x)No/ ()Yes?   Indication:          IV Fluids (x)No/ ()Yes? Type:  Rate:  Indication:    OBJECTIVE  PAST MEDICAL & SURGICAL HISTORY  DM (diabetes mellitus)  HTN (hypertension)  Glaucoma  CAD (coronary artery disease)  No significant past surgical history    ALLERGIES:  No Known Allergies    HOME MEDICATIONS:  Home Medications:  Accupril 5 mg oral tablet: 1 tab(s) orally once a day (2019 05:52)  furosemide 20 mg oral tablet: 1 tab(s) orally once a day (2019 05:52)  Iron 100 Plus oral tablet: 1 tab(s) orally once a day (2019 05:52)  latanoprost 0.005% ophthalmic solution: 1 drop(s) to each affected eye once a day (in the evening) (2019 05:52)  Lipitor 80 mg oral tablet: 1 tab(s) orally once a day (2019 05:52)  metoprolol succinate 50 mg oral tablet, extended release: 1 tab(s) orally once a day (2019 05:52)    MEDICATIONS:  STANDING MEDICATIONS  aspirin enteric coated 81 milliGRAM(s) Oral daily  atorvastatin 80 milliGRAM(s) Oral at bedtime  chlorhexidine 4% Liquid 1 Application(s) Topical <User Schedule>  dextrose 5%. 1000 milliLiter(s) (50 mL/Hr) IV Continuous <Continuous>  dextrose 50% Injectable 12.5 Gram(s) IV Push once  dextrose 50% Injectable 25 Gram(s) IV Push once  dextrose 50% Injectable 25 Gram(s) IV Push once  docusate sodium 100 milliGRAM(s) Oral three times a day  heparin  Injectable 5000 Unit(s) SubCutaneous every 8 hours  insulin glargine Injectable (LANTUS) 9 Unit(s) SubCutaneous at bedtime  insulin lispro (HumaLOG) corrective regimen sliding scale   SubCutaneous three times a day before meals  insulin lispro Injectable (HumaLOG) 3 Unit(s) SubCutaneous three times a day before meals  latanoprost 0.005% Ophthalmic Solution 1 Drop(s) Both EYES at bedtime  metoprolol succinate ER 50 milliGRAM(s) Oral daily  polyethylene glycol 3350 17 Gram(s) Oral two times a day  sodium chloride 0.9%. 500 milliLiter(s) (50 mL/Hr) IV Continuous <Continuous>  tamsulosin 0.4 milliGRAM(s) Oral at bedtime    PRN MEDICATIONS  dextrose 40% Gel 15 Gram(s) Oral once PRN  glucagon  Injectable 1 milliGRAM(s) IntraMuscular once PRN      VITAL SIGNS: Last 24 Hours  T(C): 35.9 (10 Jul 2019 13:58), Max: 36.1 (10 Jul 2019 05:58)  T(F): 96.6 (10 Jul 2019 13:58), Max: 96.9 (10 Jul 2019 05:58)  HR: 87 (10 Jul 2019 13:58) (85 - 104)  BP: 99/49 (10 Jul 2019 13:58) (93/55 - 110/61)  BP(mean): --  RR: 20 (10 Jul 2019 13:58) (18 - 20)  SpO2: 97% (10 Jul 2019 07:50) (96% - 97%)    LABS:                        8.2    12.17 )-----------( 227      ( 10 Jul 2019 05:57 )             25.6     07-10    136  |  104  |  76<HH>  ----------------------------<  122<H>  4.5   |  21  |  3.3<H>    Ca    7.4<L>      10 Jul 2019 05:57  Phos  3.5     07-09  Mg     2.2     07-09    PT/INR - ( 2019 05:48 )   PT: 12.60 sec;   INR: 1.10 ratio      PTT - ( 2019 05:48 )  PTT:43.8 sec  Urinalysis Basic - ( 2019 18:10 )    Color: Yellow / Appearance: Clear / S.020 / pH: x  Gluc: x / Ketone: Negative  / Bili: Negative / Urobili: <2 mg/dL   Blood: x / Protein: 30 mg/dL / Nitrite: Negative   Leuk Esterase: Small / RBC: 10 /HPF / WBC 10 /HPF   Sq Epi: x / Non Sq Epi: 3 /HPF / Bacteria: Negative    CARDIAC MARKERS ( 2019 05:48 )  x     / 0.11 ng/mL / x     / x     / x          RADIOLOGY:    No new imaging     ECHO:  Transthoracic Echocardiogram:   EXAM:  2-D ECHO (TTE) COMPLETE      PROCEDURE DATE:  2019        Summary:   1. Technically suboptimal study.   2. Normal global left ventricular systolic function.   3. LV Ejection Fraction by Lee's Method with a biplane EF of 53 %.   4. Mild left ventricular hypertrophy.   5. Mildly increased LV wall thickness.   6. Normal left ventricular internal cavity size.   7. Spectral Doppler shows impaired relaxation pattern of left   ventricular myocardial filling (Grade I diastolic dysfunction).   8. Mild mitral valve regurgitation.   9. Moderate thickening and calcification of the anterior and posterior   mitral valve leaflets.  10. MV leaflets thickened and calcified with MV opening not well seen .   Severe MAC. No significant MS by MV area obtained by doppler but up to   mild to moderate MS by MV gradient on doppler.  11. Fibrocalcific AV disease with reduced AV opening Severe AS by AV area   obtained but AS only mild by AV gradient and AV opening only seems   moderately reduced.  12. Mildly dilated aortic root.  13. Peak transaortic gradient equals 27.1 mmHg, mean transaortic gradient   equals 13.4 mmHg, the calculated aortic valve area equals 0.82 cm² by the   continuity equation consistent withsevere aortic stenosis.  14. The aortic valve mean gradient is 13.4 mmHg consistent with mild   aortic stenosis.      PHYSICAL EXAM:    GENERAL: NAD, cachectic, AAOx3  HEENT:  Atraumatic, Normocephalic. EOMI, PERRLA, conjunctiva and sclera clear, No JVD  PULMONARY: Clear to auscultation bilaterally; No wheeze  CARDIOVASCULAR: Regular rate and rhythm; No murmurs, rubs, or gallops  GASTROINTESTINAL: Soft, Nontender, Nondistended; Bowel sounds present  MUSCULOSKELETAL:  2+ Peripheral Pulses, No clubbing, cyanosis, or edema  NEUROLOGY: non-focal  SKIN: No rashes or lesions    ASSESSMENT & PLAN    89 years old male with reported history of CAD but refused stent, DM, and chronic anemia, presented to the ED with progressive weakness.    #) Weakness likely due to dementia progression, suspected GI malignancy; stable   -Hospice tomorrow     #) EDNA on CKD stage IV; worsening   -Pre-renal mixed with post renal, BUN/Cr > 20, cont IVF  -Prostate is enlarged measuring 8.0 x 5.8 x8.9 cm, volume 216 cc  -Urology recs appreciated, awaiting attending, subramanian placed, Flomax    -Non-oliguric  -Avoid nephrotoxic agents    #) Stool impaction; resolved     #) Hyponatremia; resolved    #) Troponemia  -stable    #) Alzheimer's Dementia  -as per daughter pt is at his baseline  -waxes and wanes    #) Normocytic anemia  -stable    #) HTN  - Continue metoprolol , holding Lisinopril for now due to hyperkalemia    #) CAD  - stable  - Start Aspirin 81mg (through not in patient's home med)  - Continue Lipitor 80mg Q24hrs    #) Hyperkalemia; resolved     #) DMII   -stable   -A1c 5.9     #) Leukocytosis; stable     Diet, DASH/TLC:   Sodium & Cholesterol Restricted  Supplement Feeding Modality:  Oral  Glucerna Shake Cans or Servings Per Day:  1       Frequency:  Daily  Ensure Pudding Cans or Servings Per Day:  1       Frequency:  Daily (19 @ 15:31)    GI ppx- Not indicated     DVT ppx- heparin sc     Dispo: Hospice in AM, will not draw blood tonight

## 2019-07-11 ENCOUNTER — OUTPATIENT (OUTPATIENT)
Dept: OUTPATIENT SERVICES | Facility: HOSPITAL | Age: 84
LOS: 1 days | End: 2019-07-11

## 2019-07-11 VITALS
RESPIRATION RATE: 18 BRPM | HEART RATE: 94 BPM | TEMPERATURE: 97 F | WEIGHT: 139.11 LBS | SYSTOLIC BLOOD PRESSURE: 117 MMHG | DIASTOLIC BLOOD PRESSURE: 63 MMHG

## 2019-07-11 LAB — GLUCOSE BLDC GLUCOMTR-MCNC: 89 MG/DL — SIGNIFICANT CHANGE UP (ref 70–99)

## 2019-07-11 PROCEDURE — 99238 HOSP IP/OBS DSCHRG MGMT 30/<: CPT

## 2019-07-11 RX ORDER — DOCUSATE SODIUM 100 MG
1 CAPSULE ORAL
Qty: 0 | Refills: 0 | DISCHARGE
Start: 2019-07-11

## 2019-07-11 RX ORDER — TAMSULOSIN HYDROCHLORIDE 0.4 MG/1
1 CAPSULE ORAL
Qty: 0 | Refills: 0 | DISCHARGE
Start: 2019-07-11

## 2019-07-11 RX ORDER — POLYETHYLENE GLYCOL 3350 17 G/17G
17 POWDER, FOR SOLUTION ORAL
Qty: 0 | Refills: 0 | DISCHARGE
Start: 2019-07-11

## 2019-07-11 RX ORDER — ASPIRIN/CALCIUM CARB/MAGNESIUM 324 MG
1 TABLET ORAL
Qty: 0 | Refills: 0 | DISCHARGE
Start: 2019-07-11

## 2019-07-11 RX ADMIN — Medication 100 MILLIGRAM(S): at 07:03

## 2019-07-11 RX ADMIN — POLYETHYLENE GLYCOL 3350 17 GRAM(S): 17 POWDER, FOR SOLUTION ORAL at 07:05

## 2019-07-11 RX ADMIN — CHLORHEXIDINE GLUCONATE 1 APPLICATION(S): 213 SOLUTION TOPICAL at 07:05

## 2019-07-11 RX ADMIN — Medication 81 MILLIGRAM(S): at 11:55

## 2019-07-11 RX ADMIN — Medication 50 MILLIGRAM(S): at 07:03

## 2019-07-11 RX ADMIN — HEPARIN SODIUM 5000 UNIT(S): 5000 INJECTION INTRAVENOUS; SUBCUTANEOUS at 07:03

## 2019-07-11 NOTE — DISCHARGE NOTE PROVIDER - NSDCCPCAREPLAN_GEN_ALL_CORE_FT
PRINCIPAL DISCHARGE DIAGNOSIS  Diagnosis: Colonic mass  Assessment and Plan of Treatment: Please continue care with Hospice      SECONDARY DISCHARGE DIAGNOSES  Diagnosis: Diabetes mellitus  Assessment and Plan of Treatment: Please continue care with Hospice    Diagnosis: CAD (coronary artery disease)  Assessment and Plan of Treatment: Please continue care with Hospice    Diagnosis: Hypertension  Assessment and Plan of Treatment: Please continue care with Hospice    Diagnosis: Acute kidney injury  Assessment and Plan of Treatment: Please continue subramanian use, you were retaining urine due to enlarged prostate.  Please continue care with Hospice    Diagnosis: CHF (congestive heart failure)  Assessment and Plan of Treatment: Please continue care with Hospice

## 2019-07-11 NOTE — DISCHARGE NOTE NURSING/CASE MANAGEMENT/SOCIAL WORK - NSDCDPATPORTLINK_GEN_ALL_CORE
You can access the [facility] Patient Portal, offered by [facility], by registering with the following website: [email or web address]

## 2019-07-11 NOTE — DISCHARGE NOTE NURSING/CASE MANAGEMENT/SOCIAL WORK - NSDCPEEMAIL_GEN_ALL_CORE
[facility] for Tobacco Control email [email or web address]

## 2019-07-11 NOTE — DISCHARGE NOTE PROVIDER - CARE PROVIDER_API CALL
Oleksandr Rios)  Family Medicine  [address] [place] [address]  Phone: [phone]  Fax: [phone]  Follow Up Time:

## 2019-07-11 NOTE — PROGRESS NOTE ADULT - ASSESSMENT
# Colon mass - no w/u, Hb stable low, pt and daughter agreed for hospice. no further w/u/intreventions.    # EDNA on CKD 4 - pt is oligouric, keep Levine in, pt will be discharged with Levine.   # leucocytosis, ? due to tumor, no signs of active infection, no ABx   # HTN - Continue metoprolol, Lisinopril on hold for now due to EDNA and hyperkalemia  # CAD - Started Aspirin 81mg, Lipitor 80mg Q24hrs and Lasix 20mg Q24hrs  # T2DM - can dc Insulin    Pt is clinically stable for discharge to hospice today

## 2019-07-11 NOTE — PROGRESS NOTE ADULT - SUBJECTIVE AND OBJECTIVE BOX
STEWART SANTORO    Patient is a 89y old  Male who presents with a chief complaint of Weakness (2019 09:42)    INTERVAL HPI/OVERNIGHT EVENTS: no events, no new complaints.    PHYSICAL EXAM:  T(C): 35.9, Max: 36.1 (07-10-19 @ 20:31)  HR: 94 (87 - 118)  BP: 117/63 (99/49 - 117/63)  RR: 18 (18 - 20)  SpO2: 95% (95% - 95%)    GENERAL: NAD, pale  PULMONARY/CHEST: No rales, rhonchi, wheezing  CARDIOVASC: Regular rate and rhythm; No murmurs  GI/ABDOMEN: Soft, Nontender, mildly distended; Bowel sounds present  EXTREMITIES:  2+ Peripheral Pulses, No clubbing, cyanosis, or edema, no deformity. No calf tenderness b/l.  NERVOUS SYSTEM:  Alert & Oriented X2-3, no focal deficit     LABS: no new labs         Urinalysis Basic - ( 2019 18:10 )    Color: Yellow / Appearance: Clear / S.020 / pH: x  Gluc: x / Ketone: Negative  / Bili: Negative / Urobili: <2 mg/dL   Blood: x / Protein: 30 mg/dL / Nitrite: Negative   Leuk Esterase: Small / RBC: 10 /HPF / WBC 10 /HPF   Sq Epi: x / Non Sq Epi: 3 /HPF / Bacteria: Negative      CAPILLARY BLOOD GLUCOSE  POCT Blood Glucose.: 89 mg/dL (2019 08:06)  POCT Blood Glucose.: 144 mg/dL (10 Jul 2019 21:51)  POCT Blood Glucose.: 181 mg/dL (10 Jul 2019 16:51)      MEDICATIONS  (STANDING):  aspirin enteric coated 81 milliGRAM(s) Oral daily  atorvastatin 80 milliGRAM(s) Oral at bedtime  chlorhexidine 4% Liquid 1 Application(s) Topical <User Schedule>  dextrose 5%. 1000 milliLiter(s) (50 mL/Hr) IV Continuous <Continuous>  dextrose 50% Injectable 12.5 Gram(s) IV Push once  dextrose 50% Injectable 25 Gram(s) IV Push once  dextrose 50% Injectable 25 Gram(s) IV Push once  docusate sodium 100 milliGRAM(s) Oral three times a day  heparin  Injectable 5000 Unit(s) SubCutaneous every 8 hours  insulin glargine Injectable (LANTUS) 9 Unit(s) SubCutaneous at bedtime  insulin lispro (HumaLOG) corrective regimen sliding scale   SubCutaneous three times a day before meals  insulin lispro Injectable (HumaLOG) 3 Unit(s) SubCutaneous three times a day before meals  latanoprost 0.005% Ophthalmic Solution 1 Drop(s) Both EYES at bedtime  metoprolol succinate ER 50 milliGRAM(s) Oral daily  polyethylene glycol 3350 17 Gram(s) Oral two times a day  sodium chloride 0.9%. 500 milliLiter(s) (50 mL/Hr) IV Continuous <Continuous>  tamsulosin 0.4 milliGRAM(s) Oral at bedtime    MEDICATIONS  (PRN):  dextrose 40% Gel 15 Gram(s) Oral once PRN Blood Glucose LESS THAN 70 milliGRAM(s)/deciliter  glucagon  Injectable 1 milliGRAM(s) IntraMuscular once PRN Glucose LESS THAN 70 milligrams/deciliter

## 2019-07-11 NOTE — DISCHARGE NOTE NURSING/CASE MANAGEMENT/SOCIAL WORK - NSDCPEWEB_GEN_ALL_CORE
[facility] for Tobacco Control website --- [email or web address] --- [email or web address]

## 2019-07-11 NOTE — DISCHARGE NOTE PROVIDER - HOSPITAL COURSE
89 years old male with reported history of CAD but refused stent, DM, and chronic anemia, presented to the ED with progressive weakness that was found to be due to suspected GI malignancy with CT A/P Ascending colon 9.8 cm long mass, suspicious for neoplasm.  He and his family were given the option of Hospice due to comorbidities and potential mortality vs. Colonoscopy and medical management of potential neoplasm and they chose to accept hospice. He was found to be in EDNA that was likely due to dehydration as well as BPH, a Levine was placed by urology and will remain as long as patient retains urine.  Patient was sent to [facility] on 07/11/19.

## 2019-07-16 DIAGNOSIS — I25.10 ATHEROSCLEROTIC HEART DISEASE OF NATIVE CORONARY ARTERY WITHOUT ANGINA PECTORIS: ICD-10-CM

## 2019-07-16 DIAGNOSIS — H40.9 UNSPECIFIED GLAUCOMA: ICD-10-CM

## 2019-07-16 DIAGNOSIS — G30.9 ALZHEIMER'S DISEASE, UNSPECIFIED: ICD-10-CM

## 2019-07-16 DIAGNOSIS — N18.4 CHRONIC KIDNEY DISEASE, STAGE 4 (SEVERE): ICD-10-CM

## 2019-07-16 DIAGNOSIS — C18.9 MALIGNANT NEOPLASM OF COLON, UNSPECIFIED: ICD-10-CM

## 2019-07-16 DIAGNOSIS — I50.9 HEART FAILURE, UNSPECIFIED: ICD-10-CM

## 2019-07-16 DIAGNOSIS — D72.829 ELEVATED WHITE BLOOD CELL COUNT, UNSPECIFIED: ICD-10-CM

## 2019-07-16 DIAGNOSIS — N40.1 BENIGN PROSTATIC HYPERPLASIA WITH LOWER URINARY TRACT SYMPTOMS: ICD-10-CM

## 2019-07-16 DIAGNOSIS — E86.0 DEHYDRATION: ICD-10-CM

## 2019-07-16 DIAGNOSIS — E87.5 HYPERKALEMIA: ICD-10-CM

## 2019-07-16 DIAGNOSIS — D63.8 ANEMIA IN OTHER CHRONIC DISEASES CLASSIFIED ELSEWHERE: ICD-10-CM

## 2019-07-16 DIAGNOSIS — N13.8 OTHER OBSTRUCTIVE AND REFLUX UROPATHY: ICD-10-CM

## 2019-07-16 DIAGNOSIS — E87.1 HYPO-OSMOLALITY AND HYPONATREMIA: ICD-10-CM

## 2019-07-16 DIAGNOSIS — R74.8 ABNORMAL LEVELS OF OTHER SERUM ENZYMES: ICD-10-CM

## 2019-07-16 DIAGNOSIS — Z91.81 HISTORY OF FALLING: ICD-10-CM

## 2019-07-16 DIAGNOSIS — E11.65 TYPE 2 DIABETES MELLITUS WITH HYPERGLYCEMIA: ICD-10-CM

## 2019-07-16 DIAGNOSIS — N17.9 ACUTE KIDNEY FAILURE, UNSPECIFIED: ICD-10-CM

## 2019-07-16 DIAGNOSIS — C18.2 MALIGNANT NEOPLASM OF ASCENDING COLON: ICD-10-CM

## 2019-07-16 DIAGNOSIS — F02.80 DEMENTIA IN OTHER DISEASES CLASSIFIED ELSEWHERE, UNSPECIFIED SEVERITY, WITHOUT BEHAVIORAL DISTURBANCE, PSYCHOTIC DISTURBANCE, MOOD DISTURBANCE, AND ANXIETY: ICD-10-CM

## 2019-07-16 DIAGNOSIS — E11.22 TYPE 2 DIABETES MELLITUS WITH DIABETIC CHRONIC KIDNEY DISEASE: ICD-10-CM

## 2019-07-16 DIAGNOSIS — Z87.891 PERSONAL HISTORY OF NICOTINE DEPENDENCE: ICD-10-CM

## 2019-07-16 DIAGNOSIS — I13.0 HYPERTENSIVE HEART AND CHRONIC KIDNEY DISEASE WITH HEART FAILURE AND STAGE 1 THROUGH STAGE 4 CHRONIC KIDNEY DISEASE, OR UNSPECIFIED CHRONIC KIDNEY DISEASE: ICD-10-CM

## 2019-07-16 PROBLEM — I10 ESSENTIAL (PRIMARY) HYPERTENSION: Chronic | Status: ACTIVE | Noted: 2019-07-05

## 2019-07-16 PROBLEM — E11.9 TYPE 2 DIABETES MELLITUS WITHOUT COMPLICATIONS: Chronic | Status: ACTIVE | Noted: 2019-07-05

## 2019-09-30 ENCOUNTER — OUTPATIENT (OUTPATIENT)
Dept: OUTPATIENT SERVICES | Facility: HOSPITAL | Age: 84
LOS: 1 days | Discharge: HOME | End: 2019-09-30

## 2019-09-30 DIAGNOSIS — Z02.9 ENCOUNTER FOR ADMINISTRATIVE EXAMINATIONS, UNSPECIFIED: ICD-10-CM

## 2019-09-30 DIAGNOSIS — I10 ESSENTIAL (PRIMARY) HYPERTENSION: ICD-10-CM

## 2019-09-30 DIAGNOSIS — R80.9 PROTEINURIA, UNSPECIFIED: ICD-10-CM

## 2019-09-30 DIAGNOSIS — N18.4 CHRONIC KIDNEY DISEASE, STAGE 4 (SEVERE): ICD-10-CM

## 2021-04-16 NOTE — PROCEDURE NOTE - PRACTITIONER PERFORMING THE TIME OUT
Echocardiogram 03/06/2021  LVEF: 56%  LA (left atrial) size:38.1 ml/m2                                             IVS: 1.5 cm  LVPW:1.4 cm  CHADvascs score: 2 (HTN, DM)  Anticoagulation: Eliquis  Antiarrhythmic: none    RS in office NSR     We discussed the pathophysiology of atrial fibrillation including the risk for stroke which is approximately  3-4 % per year. Recommend continuing with anticoagulation to reduce risk of stroke.      We discussed the following testing and options for management of atrial fibrillation:  · Pill in pocket Flecainide (patient has a history of bradycardia) vs Flecainide daily vs Tikosyn ( due to bradycardia likely to be best option)  RFA ablation of atrial fibrillation with 65% chance of no recurrence with first procedure and 85% chance of no recurrence with repeat procedure. Procedure, risks, benefits and alternatives were discussed with patient and all questioned were answered.  · Continue to monitor at home with Edusoft joshua  · History of bradycardia with AF episodes discussed possible need for PPM in future- symptomatic and presyncopal with episodes      Patient agrees the best treatment option at this time is continue to monitor.    Discussed possible triggers of AF/palpitations which are stress, caffeine, alcohol, inadequate sleep and dehydration and non-pharmaceutical measures to reduce symptoms he was advised to:  1.Continue magnesium supplement and add magnesium enriched foods in diet.  2.Increase hydration  3.Avoid alcohol  4.Avoid caffeine  5.Include potassium equivalent to a banana in daily diet.  6. Adequate sleep/ treating sleep apnea    Monitor heart rates at home. If experiencing tachy-palpitations, dizziness, lightheadedness, increase in shortness of breath or fatigue call the office to discuss medication options or to be seen earlier.    We discussed that current evidence demonstrates the importance of weight loss in preventing the recurrence of atrial fibrillation.  Specifically, we discussed that in one study, individuals who lost 10% of their current body weight were 6 times more likely to remain arrhythmia free.  Furthermore, they were less symptomatic in atrial fibrillation.       huber

## 2021-08-26 PROBLEM — Z00.00 ENCOUNTER FOR PREVENTIVE HEALTH EXAMINATION: Status: ACTIVE | Noted: 2021-08-26

## 2023-04-18 NOTE — ED ADULT TRIAGE NOTE - MODE OF ARRIVAL
[facility] - Surgery (1st Fl)  Discharge Note  Short Stay  Date of Admission: 04/18/2023    Procedure(s) (LRB):  REPAIR, RETINAL DETACHMENT, WITH VITRECTOMY (Right)      OUTCOME: Patient tolerated treatment/procedure well without complication and is now ready for discharge.    DISPOSITION: Home or Self Care    FINAL DIAGNOSIS:  Retinal detachment, right eye    FOLLOWUP: In clinic    DISCHARGE INSTRUCTIONS:  No discharge procedures on file.     TIME SPENT ON DISCHARGE: 20 minutes   EMS

## 2024-10-16 NOTE — DISCHARGE NOTE NURSING/CASE MANAGEMENT/SOCIAL WORK - NSDCPEPT PROEDHF_GEN_ALL_CORE
Call primary care provider for follow up after discharge/Activities as tolerated/Monitor weight daily/Report signs and symptoms to primary care provider/Low salt diet none